# Patient Record
Sex: FEMALE | Race: BLACK OR AFRICAN AMERICAN | NOT HISPANIC OR LATINO | Employment: OTHER | URBAN - NONMETROPOLITAN AREA
[De-identification: names, ages, dates, MRNs, and addresses within clinical notes are randomized per-mention and may not be internally consistent; named-entity substitution may affect disease eponyms.]

---

## 2018-05-11 ENCOUNTER — HOSPITAL ENCOUNTER (EMERGENCY)
Facility: HOSPITAL | Age: 83
Discharge: HOME | End: 2018-05-11
Attending: EMERGENCY MEDICINE | Admitting: NURSE PRACTITIONER

## 2018-05-11 ENCOUNTER — APPOINTMENT (OUTPATIENT)
Dept: ULTRASOUND IMAGING | Facility: HOSPITAL | Age: 83
End: 2018-05-11

## 2018-05-11 VITALS
DIASTOLIC BLOOD PRESSURE: 66 MMHG | WEIGHT: 209 LBS | HEIGHT: 65 IN | SYSTOLIC BLOOD PRESSURE: 130 MMHG | BODY MASS INDEX: 34.82 KG/M2 | OXYGEN SATURATION: 96 % | RESPIRATION RATE: 18 BRPM | TEMPERATURE: 98.7 F | HEART RATE: 60 BPM

## 2018-05-11 DIAGNOSIS — N18.9 CHRONIC RENAL IMPAIRMENT, UNSPECIFIED CKD STAGE: ICD-10-CM

## 2018-05-11 DIAGNOSIS — L03.115 CELLULITIS OF RIGHT LEG: Primary | ICD-10-CM

## 2018-05-11 DIAGNOSIS — I73.9 PERIPHERAL VASCULAR DISEASE (HCC): ICD-10-CM

## 2018-05-11 LAB
ALBUMIN SERPL-MCNC: 3.5 G/DL (ref 3.5–5)
ALBUMIN/GLOB SERPL: 1.1 G/DL (ref 1–2)
ALP SERPL-CCNC: 55 U/L (ref 38–126)
ALT SERPL W P-5'-P-CCNC: 27 U/L (ref 13–69)
ANION GAP SERPL CALCULATED.3IONS-SCNC: 13 MMOL/L (ref 10–20)
AST SERPL-CCNC: 18 U/L (ref 15–46)
BASOPHILS # BLD AUTO: 0.03 10*3/MM3 (ref 0–0.2)
BASOPHILS NFR BLD AUTO: 0.5 % (ref 0–2.5)
BILIRUB SERPL-MCNC: 1 MG/DL (ref 0.2–1.3)
BUN BLD-MCNC: 51 MG/DL (ref 7–20)
BUN/CREAT SERPL: 26.8 (ref 7.1–23.5)
CALCIUM SPEC-SCNC: 9.1 MG/DL (ref 8.4–10.2)
CHLORIDE SERPL-SCNC: 101 MMOL/L (ref 98–107)
CO2 SERPL-SCNC: 33 MMOL/L (ref 26–30)
CREAT BLD-MCNC: 1.9 MG/DL (ref 0.6–1.3)
D-LACTATE SERPL-SCNC: 1.1 MMOL/L (ref 0.5–2)
DEPRECATED RDW RBC AUTO: 55 FL (ref 37–54)
EOSINOPHIL # BLD AUTO: 0.27 10*3/MM3 (ref 0–0.7)
EOSINOPHIL NFR BLD AUTO: 4.2 % (ref 0–7)
ERYTHROCYTE [DISTWIDTH] IN BLOOD BY AUTOMATED COUNT: 15.5 % (ref 11.5–14.5)
GFR SERPL CREATININE-BSD FRML MDRD: 30 ML/MIN/1.73
GLOBULIN UR ELPH-MCNC: 3.3 GM/DL
GLUCOSE BLD-MCNC: 102 MG/DL (ref 74–98)
HCT VFR BLD AUTO: 38.5 % (ref 37–47)
HGB BLD-MCNC: 13.1 G/DL (ref 12–16)
IMM GRANULOCYTES # BLD: 0.02 10*3/MM3 (ref 0–0.06)
IMM GRANULOCYTES NFR BLD: 0.3 % (ref 0–0.6)
LYMPHOCYTES # BLD AUTO: 1.9 10*3/MM3 (ref 0.6–3.4)
LYMPHOCYTES NFR BLD AUTO: 29.7 % (ref 10–50)
MCH RBC QN AUTO: 32.8 PG (ref 27–31)
MCHC RBC AUTO-ENTMCNC: 34 G/DL (ref 30–37)
MCV RBC AUTO: 96.3 FL (ref 81–99)
MONOCYTES # BLD AUTO: 0.68 10*3/MM3 (ref 0–0.9)
MONOCYTES NFR BLD AUTO: 10.6 % (ref 0–12)
NEUTROPHILS # BLD AUTO: 3.49 10*3/MM3 (ref 2–6.9)
NEUTROPHILS NFR BLD AUTO: 54.7 % (ref 37–80)
NRBC BLD MANUAL-RTO: 0 /100 WBC (ref 0–0)
PLATELET # BLD AUTO: 112 10*3/MM3 (ref 130–400)
PMV BLD AUTO: 10.9 FL (ref 6–12)
POTASSIUM BLD-SCNC: 3 MMOL/L (ref 3.5–5.1)
PROT SERPL-MCNC: 6.8 G/DL (ref 6.3–8.2)
RBC # BLD AUTO: 4 10*6/MM3 (ref 4.2–5.4)
SODIUM BLD-SCNC: 144 MMOL/L (ref 137–145)
WBC NRBC COR # BLD: 6.39 10*3/MM3 (ref 4.8–10.8)

## 2018-05-11 PROCEDURE — 80053 COMPREHEN METABOLIC PANEL: CPT | Performed by: NURSE PRACTITIONER

## 2018-05-11 PROCEDURE — 99284 EMERGENCY DEPT VISIT MOD MDM: CPT

## 2018-05-11 PROCEDURE — 83605 ASSAY OF LACTIC ACID: CPT | Performed by: NURSE PRACTITIONER

## 2018-05-11 PROCEDURE — 85025 COMPLETE CBC W/AUTO DIFF WBC: CPT | Performed by: NURSE PRACTITIONER

## 2018-05-11 PROCEDURE — 93971 EXTREMITY STUDY: CPT

## 2018-05-11 PROCEDURE — 87040 BLOOD CULTURE FOR BACTERIA: CPT | Performed by: NURSE PRACTITIONER

## 2018-05-11 RX ORDER — TRIAMTERENE AND HYDROCHLOROTHIAZIDE 75; 50 MG/1; MG/1
1 TABLET ORAL DAILY
COMMUNITY
End: 2018-09-29

## 2018-05-11 RX ORDER — FUROSEMIDE 20 MG/1
20 TABLET ORAL 2 TIMES DAILY
COMMUNITY
End: 2018-09-29

## 2018-05-11 RX ORDER — CARVEDILOL 12.5 MG/1
12.5 TABLET ORAL 2 TIMES DAILY WITH MEALS
COMMUNITY

## 2018-05-11 RX ORDER — ATORVASTATIN CALCIUM 40 MG/1
40 TABLET, FILM COATED ORAL DAILY
COMMUNITY

## 2018-05-11 RX ORDER — CEPHALEXIN 500 MG/1
500 CAPSULE ORAL 3 TIMES DAILY
Qty: 30 CAPSULE | Refills: 0 | Status: SHIPPED | OUTPATIENT
Start: 2018-05-11 | End: 2022-09-09 | Stop reason: HOSPADM

## 2018-05-11 RX ORDER — SODIUM CHLORIDE 0.9 % (FLUSH) 0.9 %
10 SYRINGE (ML) INJECTION AS NEEDED
Status: DISCONTINUED | OUTPATIENT
Start: 2018-05-11 | End: 2018-05-11 | Stop reason: HOSPADM

## 2018-05-11 RX ORDER — POTASSIUM CHLORIDE 750 MG/1
10 TABLET, FILM COATED, EXTENDED RELEASE ORAL 2 TIMES DAILY
COMMUNITY

## 2018-05-11 RX ORDER — VALSARTAN 320 MG/1
320 TABLET ORAL DAILY
COMMUNITY

## 2018-05-11 RX ADMIN — SODIUM CHLORIDE 500 ML: 9 INJECTION, SOLUTION INTRAVENOUS at 14:34

## 2018-05-11 NOTE — DISCHARGE INSTRUCTIONS
CONTINUE strict skin care to the patient's legs.  Keep skin moisturized to avoid breaking of the scaling lesions.  Apply the antibiotic ointment topically to any broken skin.  Antibiotics as directed.  You have an appointment at [facility] on Monday at 12:45 PM.    In care of her kidney function today.  Recommend decreasing the triamterene-hydrochlorothiazide medication by half each day.     Have a conversation with your primary care provider on Monday about home health resources that she may likely qualify for     Thank you and have a wonderful weekend.

## 2018-05-11 NOTE — ED PROVIDER NOTES
"Subjective   Patient presents to the emergency department in the care of her daughter who reports that the patient has had serial lesions to her lower legs which have healed in various times but most recently, she has a lesion on the right posterior lower leg that appears to be infected.  Patient has severe peripheral vascular disease to her lower limbs and clearly has a chronic problem with stable swelling and lichenification with ulceration to her legs.  She has had no fever.  She is eating, drinking, and voiding well.      Rash   Location:  Leg  Leg rash location:  R leg  Quality: dryness, peeling and redness    Severity:  Mild  Onset quality:  Gradual  Progression:  Worsening  Chronicity:  Recurrent  Relieved by:  Nothing  Worsened by:  Nothing  Ineffective treatments: daughter has an unknown prescription cream that they use on skin disruptions   Associated symptoms: myalgias (chronic)    Associated symptoms: no fever        Review of Systems   Constitutional: Negative for fever.   Eyes: Negative.    Respiratory: Negative.    Cardiovascular: Negative.    Gastrointestinal: Negative.    Endocrine: Negative.    Genitourinary: Negative.    Musculoskeletal: Positive for myalgias (chronic). Negative for back pain and neck pain.   Skin: Positive for rash. Negative for pallor.        Peripheral vascular disease and subsequent lichenification and scaling of her skin with various ulcerations   Allergic/Immunologic: Negative.    Neurological: Negative.    Hematological: Negative.    Psychiatric/Behavioral: Negative.    All other systems reviewed and are negative.      Past Medical History:   Diagnosis Date   • Hyperlipidemia    • Hypertension    • Myocardial infarction     family reports PCP stated pt had \"minor heart attack\"       No Known Allergies    Past Surgical History:   Procedure Laterality Date   • HYSTERECTOMY         History reviewed. No pertinent family history.    Social History     Social History   • Marital " status:      Social History Main Topics   • Smoking status: Never Smoker   • Alcohol use No   • Drug use: No   • Sexual activity: Defer     Other Topics Concern   • Not on file           Objective   Physical Exam   Constitutional: She is oriented to person, place, and time. She appears well-developed and well-nourished.   HENT:   Head: Normocephalic and atraumatic.   Eyes: EOM are normal. Pupils are equal, round, and reactive to light.   Neck: Normal range of motion. Neck supple.   Cardiovascular: Normal rate and regular rhythm.    Pulmonary/Chest: Effort normal and breath sounds normal. No respiratory distress. She has no wheezes. She has no rales.   Abdominal: Soft. Bowel sounds are normal. She exhibits no distension. There is no rebound and no guarding.   Musculoskeletal: Normal range of motion. She exhibits no edema.   Neurological: She is alert and oriented to person, place, and time.   Skin: Skin is warm and dry. Capillary refill takes less than 2 seconds. She is not diaphoretic.   On the patient's right lower extremity limb patient has chronic lymphedema and pedal edema bilaterally with referral vascular disease and subsequent lichenification and scaling of her skin.  There are several ulcerations which are in good repair and an healing condition.  Patient has onychomycotic nails and normal sensation.  There is a 1 cm x 1 cm scale that is disrupted on the posterior lower leg on the right that has some local redness and induration without circumferential swelling or wheezing.  There is no abscess or fluctuance.   Psychiatric: She has a normal mood and affect. Her behavior is normal.   Nursing note and vitals reviewed.      Procedures           ED Course  ED Course   Value Comment By Time   BUN: (!) 51 (Reviewed) Adwoa Guzman, APRN 05/11 1353   Creatinine: (!) 1.90 (Reviewed) Adwoa Guzman, APRN 05/11 1353   BUN: (!) 51 (Reviewed) Adwoa Guzman, APRN 05/11 1358   Creatinine: (!) 1.90  (Reviewed) Adwoamagdy Guzman, APRN 05/11 1358   Potassium: (!) 3.0 (Reviewed) Adwoa Guzman, APRN 05/11 1356    I have conferred with our ER physician, Dr. Chavez.   Adwoa Guzman, APRN 05/11 6994    I have spoken to the patient's provider, Omar Crowley at [facility] here in [place].  Patient's renal insufficiency has been noted just recently in mid April where she had a creatinine of 1.6.  Her provider makes note that her creatinine has been rising slowly but steadily in the last year, confirming a chronic problem.  In the meantime, her potassium is not elevated but rather it is slightly low at 3.0.    I have discussed this with the many family members who care for her.  Understand after some discussion that the patient does not meet admission criteria.  They understand that outpatient follow-up is recommended together with good hydration.  I have reviewed her medications and she is indeed taking her potassium daily.  She is on Lasix as well as hydrochlorothiazide.  I am going to decrease her hydrochlorothiazide by half each day in an effort to preserve her renal function a little bit.  I have made an appointment for her at [facility] with her doctor on Monday at 1245.      I have admonished the family for the good care of her skin on her legs and recommended continued diligence.  Continue discussion leads me to think that the family may have unrealistic expectations about what may be done for the patient's chronic leg lymphedema and peripheral vascular disease of her legs.  I have recommended elevation, continued good skin care with close follow-up.  This superficial infection on her leg will be managed outpatient and they understand the rationale.    I have also recommended that they confer with primary care regarding home health options to give them some assistance. Adwoa Guzman, APRN 05/11 1500        Recent Results (from the past 24 hour(s))   Comprehensive Metabolic  Panel    Collection Time: 05/11/18  1:06 PM   Result Value Ref Range    Glucose 102 (H) 74 - 98 mg/dL    BUN 51 (H) 7 - 20 mg/dL    Creatinine 1.90 (H) 0.60 - 1.30 mg/dL    Sodium 144 137 - 145 mmol/L    Potassium 3.0 (L) 3.5 - 5.1 mmol/L    Chloride 101 98 - 107 mmol/L    CO2 33.0 (H) 26.0 - 30.0 mmol/L    Calcium 9.1 8.4 - 10.2 mg/dL    Total Protein 6.8 6.3 - 8.2 g/dL    Albumin 3.50 3.50 - 5.00 g/dL    ALT (SGPT) 27 13 - 69 U/L    AST (SGOT) 18 15 - 46 U/L    Alkaline Phosphatase 55 38 - 126 U/L    Total Bilirubin 1.0 0.2 - 1.3 mg/dL    eGFR  African Amer 30 (L) >60 mL/min/1.73    Globulin 3.3 gm/dL    A/G Ratio 1.1 1.0 - 2.0 g/dL    BUN/Creatinine Ratio 26.8 (H) 7.1 - 23.5    Anion Gap 13.0 10.0 - 20.0 mmol/L   Lactic Acid, Plasma    Collection Time: 05/11/18  1:06 PM   Result Value Ref Range    Lactate 1.1 0.5 - 2.0 mmol/L   CBC Auto Differential    Collection Time: 05/11/18  1:06 PM   Result Value Ref Range    WBC 6.39 4.80 - 10.80 10*3/mm3    RBC 4.00 (L) 4.20 - 5.40 10*6/mm3    Hemoglobin 13.1 12.0 - 16.0 g/dL    Hematocrit 38.5 37.0 - 47.0 %    MCV 96.3 81.0 - 99.0 fL    MCH 32.8 (H) 27.0 - 31.0 pg    MCHC 34.0 30.0 - 37.0 g/dL    RDW 15.5 (H) 11.5 - 14.5 %    RDW-SD 55.0 (H) 37.0 - 54.0 fl    MPV 10.9 6.0 - 12.0 fL    Platelets 112 (L) 130 - 400 10*3/mm3    Neutrophil % 54.7 37.0 - 80.0 %    Lymphocyte % 29.7 10.0 - 50.0 %    Monocyte % 10.6 0.0 - 12.0 %    Eosinophil % 4.2 0.0 - 7.0 %    Basophil % 0.5 0.0 - 2.5 %    Immature Grans % 0.3 0.0 - 0.6 %    Neutrophils, Absolute 3.49 2.00 - 6.90 10*3/mm3    Lymphocytes, Absolute 1.90 0.60 - 3.40 10*3/mm3    Monocytes, Absolute 0.68 0.00 - 0.90 10*3/mm3    Eosinophils, Absolute 0.27 0.00 - 0.70 10*3/mm3    Basophils, Absolute 0.03 0.00 - 0.20 10*3/mm3    Immature Grans, Absolute 0.02 0.00 - 0.06 10*3/mm3    nRBC 0.0 0.0 - 0.0 /100 WBC     Note: In addition to lab results from this visit, the labs listed above may include labs taken at another facility or  "during a different encounter within the last 24 hours. Please correlate lab times with ED admission and discharge times for further clarification of the services performed during this visit.    US Venous Doppler Lower Extremity Right (duplex)   Final Result   No evidence of deep venous thrombosis.       This report was finalized on 5/11/2018 1:48 PM by Bryn Harper M.D..        Vitals:    05/11/18 1046 05/11/18 1437 05/11/18 1438   BP: 94/58 125/67 125/67   BP Location:  Left arm    Patient Position:  Lying    Pulse: 67 58    Resp: 18 17    Temp: 98.8 °F (37.1 °C) 98.7 °F (37.1 °C)    TempSrc:  Oral    SpO2: 98% 97% 96%   Weight: 94.8 kg (209 lb)     Height: 165.1 cm (65\")       Medications   sodium chloride 0.9 % flush 10 mL (not administered)   sodium chloride 0.9 % bolus 500 mL (0 mL Intravenous Stopped 5/11/18 1506)     ECG/EMG Results (last 24 hours)     ** No results found for the last 24 hours. **                  [facility]      Final diagnoses:   Cellulitis of right leg   Peripheral vascular disease   Chronic renal impairment, unspecified CKD stage            Adwoa Guzman, APRN  05/11/18 1604    "

## 2018-05-16 LAB
BACTERIA SPEC AEROBE CULT: NORMAL
BACTERIA SPEC AEROBE CULT: NORMAL

## 2018-09-28 ENCOUNTER — APPOINTMENT (OUTPATIENT)
Dept: GENERAL RADIOLOGY | Facility: HOSPITAL | Age: 83
End: 2018-09-28

## 2018-09-28 ENCOUNTER — HOSPITAL ENCOUNTER (EMERGENCY)
Facility: HOSPITAL | Age: 83
Discharge: HOME | End: 2018-09-29
Attending: EMERGENCY MEDICINE | Admitting: EMERGENCY MEDICINE
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
## 2018-09-28 DIAGNOSIS — E86.0 DEHYDRATION: ICD-10-CM

## 2018-09-28 DIAGNOSIS — S80.02XA CONTUSION OF LEFT KNEE, INITIAL ENCOUNTER: ICD-10-CM

## 2018-09-28 DIAGNOSIS — N17.9 AKI (ACUTE KIDNEY INJURY) (HCC): Primary | ICD-10-CM

## 2018-09-28 LAB
ALBUMIN SERPL-MCNC: 3.6 G/DL (ref 3.5–5)
ALBUMIN/GLOB SERPL: 1.1 G/DL (ref 1–2)
ALP SERPL-CCNC: 41 U/L (ref 38–126)
ALT SERPL W P-5'-P-CCNC: 31 U/L (ref 13–69)
ANION GAP SERPL CALCULATED.3IONS-SCNC: 13.1 MMOL/L (ref 10–20)
AST SERPL-CCNC: 20 U/L (ref 15–46)
BACTERIA UR QL AUTO: ABNORMAL /HPF
BASOPHILS # BLD AUTO: 0.03 10*3/MM3 (ref 0–0.2)
BASOPHILS NFR BLD AUTO: 0.5 % (ref 0–2.5)
BILIRUB SERPL-MCNC: 0.7 MG/DL (ref 0.2–1.3)
BILIRUB UR QL STRIP: NEGATIVE
BUN BLD-MCNC: 85 MG/DL (ref 7–20)
BUN/CREAT SERPL: 21.8 (ref 7.1–23.5)
CALCIUM SPEC-SCNC: 9.3 MG/DL (ref 8.4–10.2)
CHLORIDE SERPL-SCNC: 101 MMOL/L (ref 98–107)
CLARITY UR: CLEAR
CO2 SERPL-SCNC: 30 MMOL/L (ref 26–30)
COLOR UR: YELLOW
CREAT BLD-MCNC: 3.9 MG/DL (ref 0.6–1.3)
DEPRECATED RDW RBC AUTO: 50.8 FL (ref 37–54)
EOSINOPHIL # BLD AUTO: 0.2 10*3/MM3 (ref 0–0.7)
EOSINOPHIL NFR BLD AUTO: 3 % (ref 0–7)
ERYTHROCYTE [DISTWIDTH] IN BLOOD BY AUTOMATED COUNT: 14.2 % (ref 11.5–14.5)
GFR SERPL CREATININE-BSD FRML MDRD: 13 ML/MIN/1.73
GFR SERPL CREATININE-BSD FRML MDRD: ABNORMAL ML/MIN/1.73
GLOBULIN UR ELPH-MCNC: 3.2 GM/DL
GLUCOSE BLD-MCNC: 111 MG/DL (ref 74–98)
GLUCOSE UR STRIP-MCNC: NEGATIVE MG/DL
HCT VFR BLD AUTO: 35.4 % (ref 37–47)
HGB BLD-MCNC: 11.7 G/DL (ref 12–16)
HGB UR QL STRIP.AUTO: NEGATIVE
HYALINE CASTS UR QL AUTO: ABNORMAL /LPF
IMM GRANULOCYTES # BLD: 0.02 10*3/MM3 (ref 0–0.06)
IMM GRANULOCYTES NFR BLD: 0.3 % (ref 0–0.6)
KETONES UR QL STRIP: NEGATIVE
LEUKOCYTE ESTERASE UR QL STRIP.AUTO: ABNORMAL
LYMPHOCYTES # BLD AUTO: 1.46 10*3/MM3 (ref 0.6–3.4)
LYMPHOCYTES NFR BLD AUTO: 22.2 % (ref 10–50)
MCH RBC QN AUTO: 32.1 PG (ref 27–31)
MCHC RBC AUTO-ENTMCNC: 33.1 G/DL (ref 30–37)
MCV RBC AUTO: 97.3 FL (ref 81–99)
MONOCYTES # BLD AUTO: 0.77 10*3/MM3 (ref 0–0.9)
MONOCYTES NFR BLD AUTO: 11.7 % (ref 0–12)
NEUTROPHILS # BLD AUTO: 4.09 10*3/MM3 (ref 2–6.9)
NEUTROPHILS NFR BLD AUTO: 62.3 % (ref 37–80)
NITRITE UR QL STRIP: NEGATIVE
NRBC BLD MANUAL-RTO: 0 /100 WBC (ref 0–0)
NT-PROBNP SERPL-MCNC: 3780 PG/ML (ref 0–450)
PH UR STRIP.AUTO: 6 [PH] (ref 5–8)
PLATELET # BLD AUTO: 118 10*3/MM3 (ref 130–400)
PMV BLD AUTO: 10.9 FL (ref 6–12)
POTASSIUM BLD-SCNC: 3.1 MMOL/L (ref 3.5–5.1)
PROT SERPL-MCNC: 6.8 G/DL (ref 6.3–8.2)
PROT UR QL STRIP: NEGATIVE
RBC # BLD AUTO: 3.64 10*6/MM3 (ref 4.2–5.4)
RBC # UR: ABNORMAL /HPF
REF LAB TEST METHOD: ABNORMAL
SODIUM BLD-SCNC: 141 MMOL/L (ref 137–145)
SP GR UR STRIP: 1.01 (ref 1–1.03)
SQUAMOUS #/AREA URNS HPF: ABNORMAL /HPF
UROBILINOGEN UR QL STRIP: ABNORMAL
WBC NRBC COR # BLD: 6.57 10*3/MM3 (ref 4.8–10.8)
WBC UR QL AUTO: ABNORMAL /HPF

## 2018-09-28 PROCEDURE — 80053 COMPREHEN METABOLIC PANEL: CPT | Performed by: EMERGENCY MEDICINE

## 2018-09-28 PROCEDURE — 72170 X-RAY EXAM OF PELVIS: CPT

## 2018-09-28 PROCEDURE — 85025 COMPLETE CBC W/AUTO DIFF WBC: CPT | Performed by: EMERGENCY MEDICINE

## 2018-09-28 PROCEDURE — 99283 EMERGENCY DEPT VISIT LOW MDM: CPT

## 2018-09-28 PROCEDURE — 83880 ASSAY OF NATRIURETIC PEPTIDE: CPT | Performed by: EMERGENCY MEDICINE

## 2018-09-28 PROCEDURE — 81001 URINALYSIS AUTO W/SCOPE: CPT | Performed by: EMERGENCY MEDICINE

## 2018-09-28 PROCEDURE — 73562 X-RAY EXAM OF KNEE 3: CPT

## 2018-09-28 PROCEDURE — 71045 X-RAY EXAM CHEST 1 VIEW: CPT

## 2018-09-29 VITALS
HEART RATE: 59 BPM | SYSTOLIC BLOOD PRESSURE: 123 MMHG | OXYGEN SATURATION: 96 % | RESPIRATION RATE: 16 BRPM | DIASTOLIC BLOOD PRESSURE: 66 MMHG | TEMPERATURE: 98.8 F

## 2018-09-29 PROBLEM — E86.0 DEHYDRATION: Status: ACTIVE | Noted: 2018-09-29

## 2018-09-29 PROBLEM — N17.9 AKI (ACUTE KIDNEY INJURY): Status: ACTIVE | Noted: 2018-09-29
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
## 2018-09-29 PROBLEM — S80.02XA CONTUSION OF LEFT KNEE: Status: ACTIVE | Noted: 2018-09-29

## 2018-09-29 PROCEDURE — 96360 HYDRATION IV INFUSION INIT: CPT

## 2018-09-29 RX ADMIN — SODIUM CHLORIDE 500 ML: 9 INJECTION, SOLUTION INTRAVENOUS at 00:45

## 2020-10-08 ENCOUNTER — HOSPITAL ENCOUNTER (EMERGENCY)
Facility: HOSPITAL | Age: 85
Discharge: HOME | End: 2020-10-08
Attending: EMERGENCY MEDICINE | Admitting: EMERGENCY MEDICINE

## 2020-10-08 ENCOUNTER — APPOINTMENT (OUTPATIENT)
Dept: GENERAL RADIOLOGY | Facility: HOSPITAL | Age: 85
End: 2020-10-08

## 2020-10-08 VITALS
TEMPERATURE: 98 F | DIASTOLIC BLOOD PRESSURE: 58 MMHG | OXYGEN SATURATION: 95 % | WEIGHT: 200 LBS | HEIGHT: 65 IN | BODY MASS INDEX: 33.32 KG/M2 | HEART RATE: 52 BPM | RESPIRATION RATE: 14 BRPM | SYSTOLIC BLOOD PRESSURE: 113 MMHG

## 2020-10-08 DIAGNOSIS — R07.9 CHEST PAIN, UNSPECIFIED TYPE: Primary | ICD-10-CM

## 2020-10-08 DIAGNOSIS — E87.6 HYPOKALEMIA: ICD-10-CM

## 2020-10-08 LAB
ALBUMIN SERPL-MCNC: 3.2 G/DL (ref 3.5–5.2)
ALBUMIN/GLOB SERPL: 1 G/DL
ALP SERPL-CCNC: 52 U/L (ref 39–117)
ALT SERPL W P-5'-P-CCNC: 8 U/L (ref 1–33)
ANION GAP SERPL CALCULATED.3IONS-SCNC: 12.8 MMOL/L (ref 5–15)
AST SERPL-CCNC: 14 U/L (ref 1–32)
BASOPHILS # BLD AUTO: 0.04 10*3/MM3 (ref 0–0.2)
BASOPHILS NFR BLD AUTO: 0.5 % (ref 0–1.5)
BILIRUB SERPL-MCNC: 1.3 MG/DL (ref 0–1.2)
BUN SERPL-MCNC: 32 MG/DL (ref 8–23)
BUN/CREAT SERPL: 20 (ref 7–25)
CALCIUM SPEC-SCNC: 9.2 MG/DL (ref 8.6–10.5)
CHLORIDE SERPL-SCNC: 97 MMOL/L (ref 98–107)
CO2 SERPL-SCNC: 30.2 MMOL/L (ref 22–29)
CREAT SERPL-MCNC: 1.6 MG/DL (ref 0.57–1)
DEPRECATED RDW RBC AUTO: 50.2 FL (ref 37–54)
EOSINOPHIL # BLD AUTO: 0.29 10*3/MM3 (ref 0–0.4)
EOSINOPHIL NFR BLD AUTO: 3.7 % (ref 0.3–6.2)
ERYTHROCYTE [DISTWIDTH] IN BLOOD BY AUTOMATED COUNT: 14.5 % (ref 12.3–15.4)
GFR SERPL CREATININE-BSD FRML MDRD: 37 ML/MIN/1.73
GLOBULIN UR ELPH-MCNC: 3.1 GM/DL
GLUCOSE SERPL-MCNC: 107 MG/DL (ref 65–99)
HCT VFR BLD AUTO: 37.7 % (ref 34–46.6)
HGB BLD-MCNC: 12.8 G/DL (ref 12–15.9)
HOLD SPECIMEN: NORMAL
IMM GRANULOCYTES # BLD AUTO: 0.04 10*3/MM3 (ref 0–0.05)
IMM GRANULOCYTES NFR BLD AUTO: 0.5 % (ref 0–0.5)
LYMPHOCYTES # BLD AUTO: 2.09 10*3/MM3 (ref 0.7–3.1)
LYMPHOCYTES NFR BLD AUTO: 26.4 % (ref 19.6–45.3)
MCH RBC QN AUTO: 31.9 PG (ref 26.6–33)
MCHC RBC AUTO-ENTMCNC: 34 G/DL (ref 31.5–35.7)
MCV RBC AUTO: 94 FL (ref 79–97)
MONOCYTES # BLD AUTO: 1.22 10*3/MM3 (ref 0.1–0.9)
MONOCYTES NFR BLD AUTO: 15.4 % (ref 5–12)
NEUTROPHILS NFR BLD AUTO: 4.25 10*3/MM3 (ref 1.7–7)
NEUTROPHILS NFR BLD AUTO: 53.5 % (ref 42.7–76)
NRBC BLD AUTO-RTO: 0 /100 WBC (ref 0–0.2)
PLATELET # BLD AUTO: 126 10*3/MM3 (ref 140–450)
PMV BLD AUTO: 11 FL (ref 6–12)
POTASSIUM SERPL-SCNC: 2.8 MMOL/L (ref 3.5–5.2)
PROT SERPL-MCNC: 6.3 G/DL (ref 6–8.5)
RBC # BLD AUTO: 4.01 10*6/MM3 (ref 3.77–5.28)
SODIUM SERPL-SCNC: 140 MMOL/L (ref 136–145)
TROPONIN T SERPL-MCNC: <0.01 NG/ML (ref 0–0.03)
WBC # BLD AUTO: 7.93 10*3/MM3 (ref 3.4–10.8)
WHOLE BLOOD HOLD SPECIMEN: NORMAL

## 2020-10-08 PROCEDURE — 71045 X-RAY EXAM CHEST 1 VIEW: CPT

## 2020-10-08 PROCEDURE — 80053 COMPREHEN METABOLIC PANEL: CPT

## 2020-10-08 PROCEDURE — 85025 COMPLETE CBC W/AUTO DIFF WBC: CPT

## 2020-10-08 PROCEDURE — 99284 EMERGENCY DEPT VISIT MOD MDM: CPT

## 2020-10-08 PROCEDURE — 93005 ELECTROCARDIOGRAM TRACING: CPT

## 2020-10-08 PROCEDURE — 84484 ASSAY OF TROPONIN QUANT: CPT

## 2020-10-08 RX ORDER — POTASSIUM CHLORIDE 750 MG/1
40 CAPSULE, EXTENDED RELEASE ORAL ONCE
Status: DISCONTINUED | OUTPATIENT
Start: 2020-10-08 | End: 2020-10-08

## 2020-10-08 RX ORDER — POTASSIUM CHLORIDE 750 MG/1
20 CAPSULE, EXTENDED RELEASE ORAL ONCE
Status: COMPLETED | OUTPATIENT
Start: 2020-10-08 | End: 2020-10-08

## 2020-10-08 RX ORDER — ASPIRIN 325 MG
325 TABLET ORAL ONCE
Status: DISCONTINUED | OUTPATIENT
Start: 2020-10-08 | End: 2020-10-08

## 2020-10-08 RX ORDER — ERGOCALCIFEROL 1.25 MG/1
50000 CAPSULE ORAL WEEKLY
COMMUNITY

## 2020-10-08 RX ORDER — TRIAMTERENE AND HYDROCHLOROTHIAZIDE 75; 50 MG/1; MG/1
1 TABLET ORAL DAILY
COMMUNITY

## 2020-10-08 RX ORDER — SODIUM CHLORIDE 0.9 % (FLUSH) 0.9 %
10 SYRINGE (ML) INJECTION AS NEEDED
Status: DISCONTINUED | OUTPATIENT
Start: 2020-10-08 | End: 2020-10-08 | Stop reason: HOSPADM

## 2020-10-08 RX ORDER — CLINDAMYCIN HYDROCHLORIDE 300 MG/1
300 CAPSULE ORAL 3 TIMES DAILY
COMMUNITY
End: 2022-09-09 | Stop reason: HOSPADM

## 2020-10-08 RX ORDER — FUROSEMIDE 20 MG/1
20 TABLET ORAL 2 TIMES DAILY
COMMUNITY

## 2020-10-08 RX ORDER — LORATADINE 10 MG/1
10 TABLET ORAL DAILY
COMMUNITY

## 2020-10-08 RX ADMIN — POTASSIUM CHLORIDE 20 MEQ: 10 CAPSULE, COATED, EXTENDED RELEASE ORAL at 17:06

## 2020-10-08 NOTE — ED PROVIDER NOTES
"Subjective   History of Present Illness  Is a 88-year-old female who comes in today complaining of intermittent chest pain x1 week.  Her daughter reports that she started having chest pain again while sitting in the chair today about 1 to 2 hours prior to arrival.  She denies any chest pain at this time.  She denies any fever chills or shortness of breath.  Review of Systems   Constitutional: Negative.    HENT: Negative.    Eyes: Negative.    Respiratory: Negative.    Cardiovascular: Positive for chest pain.   Gastrointestinal: Negative.    Endocrine: Negative.    Genitourinary: Negative.    Musculoskeletal: Negative.    Skin: Negative.    Allergic/Immunologic: Negative.    Neurological: Negative.    Hematological: Negative.    Psychiatric/Behavioral: Negative.        Past Medical History:   Diagnosis Date   • Hyperlipidemia    • Hypertension    • Myocardial infarction (CMS/HCC)     family reports PCP stated pt had \"minor heart attack\"       No Known Allergies    Past Surgical History:   Procedure Laterality Date   • HYSTERECTOMY         History reviewed. No pertinent family history.    Social History     Socioeconomic History   • Marital status:      Spouse name: Not on file   • Number of children: Not on file   • Years of education: Not on file   • Highest education level: Not on file   Tobacco Use   • Smoking status: Never Smoker   Substance and Sexual Activity   • Alcohol use: No   • Drug use: No   • Sexual activity: Defer           Objective   Physical Exam  Vitals signs and nursing note reviewed.   Constitutional:       Appearance: She is well-developed and normal weight.   Neurological:      Mental Status: She is alert.     GEN: No acute distress  Head: Normocephalic, atraumatic  Eyes: Pupils equal round reactive to light  ENT: Posterior pharynx normal in appearance, oral mucosa is moist  Chest: Nontender to palpation  Cardiovascular: Regular rate  Lungs: Clear to auscultation bilaterally  Abdomen: " "Soft, nontender, nondistended, no peritoneal signs  Extremities: Edema noted with lower extremities wrapped for venous insufficiency  Neuro: GCS 15  Psych: Mood and affect are appropriate      Procedures           ED Course  ED Course as of Oct 08 1710   Thu Oct 08, 2020   1559 EKG interpreted by me reveals atrial fibrillation with a rate of 61 bpm.  There are diffuse T wave inversions and nonspecific ST segment abnormalities.  This is an abnormal appearing EKG.  Most findings are similar to previous EKG.    [TB]   1629 Patient was difficulty obtaining labs.  The lab person was in drawing her blood.  The patient responded that the lab person did not want to draw her blood because she did not want to draw \"black persons blood\".  I reassured her that that was not the case that she was difficult obtaining labs due to her veins.  She had been stuck multiple times prior.    [TW]   1706 Patient was just prescribed potassium from her pcp and she took one just prior to arrival 20meq.we will give her 20meq more now and have her also take another 40meq in four hours. I have advised her to follow up with her pcp and we will give her another cardiologist to follow up with. They are agreeable to this plan of care.     [TW]      ED Course User Index  [TB] Meli Thorpe MD  [TW] Gilma Crowley, OLLIE                                           [facility]  Number of Diagnoses or Management Options     Amount and/or Complexity of Data Reviewed  Clinical lab tests: ordered and reviewed  Tests in the radiology section of CPT®: reviewed and ordered    Risk of Complications, Morbidity, and/or Mortality  Presenting problems: moderate  Diagnostic procedures: moderate  Management options: moderate        Final diagnoses:   Chest pain, unspecified type   Hypokalemia            Gilma Crowley APRN  10/08/20 1710       Gilma Crowley APRN  10/08/20 1710    "

## 2021-08-10 ENCOUNTER — LAB REQUISITION (OUTPATIENT)
Dept: LAB | Facility: HOSPITAL | Age: 86
End: 2021-08-10

## 2021-08-10 DIAGNOSIS — E11.51 TYPE 2 DIABETES MELLITUS WITH DIABETIC PERIPHERAL ANGIOPATHY WITHOUT GANGRENE (HCC): ICD-10-CM

## 2021-08-10 PROCEDURE — 87205 SMEAR GRAM STAIN: CPT | Performed by: FAMILY MEDICINE

## 2021-08-10 PROCEDURE — 87070 CULTURE OTHR SPECIMN AEROBIC: CPT | Performed by: FAMILY MEDICINE

## 2021-08-13 LAB
BACTERIA SPEC AEROBE CULT: ABNORMAL
GRAM STN SPEC: ABNORMAL

## 2021-09-07 ENCOUNTER — OFFICE VISIT (OUTPATIENT)
Dept: CARDIOLOGY | Facility: CLINIC | Age: 86
End: 2021-09-07

## 2021-09-07 VITALS
SYSTOLIC BLOOD PRESSURE: 126 MMHG | HEART RATE: 70 BPM | BODY MASS INDEX: 33.28 KG/M2 | HEIGHT: 65 IN | DIASTOLIC BLOOD PRESSURE: 60 MMHG | OXYGEN SATURATION: 98 %

## 2021-09-07 DIAGNOSIS — R01.1 HEART MURMUR: ICD-10-CM

## 2021-09-07 DIAGNOSIS — I48.20 CHRONIC ATRIAL FIBRILLATION (HCC): Primary | ICD-10-CM

## 2021-09-07 DIAGNOSIS — R07.9 CHEST PAIN, UNSPECIFIED TYPE: ICD-10-CM

## 2021-09-07 PROCEDURE — 99204 OFFICE O/P NEW MOD 45 MIN: CPT | Performed by: INTERNAL MEDICINE

## 2021-09-07 PROCEDURE — 93000 ELECTROCARDIOGRAM COMPLETE: CPT | Performed by: INTERNAL MEDICINE

## 2021-09-07 NOTE — PROGRESS NOTES
[facility] OP Consult Note    Yovana Mcneal  [id number]  09/07/2021    Referred By: Domenica Fernandez DO    Chief Complaint: Atrial fibrillation and chest pain    History of Present Illness:   Mrs. Yovana Mcneal is a 89 y.o. female who presents to the Cardiology Clinic for evaluation of atrial fibrillation.  The patient has a past medical history significant for hypertension hyperlipidemia, and dementia.  She has a past cardiac history significant for atrial fibrillation, documented on ECG as early as 2015.  She presents the cardiology clinic today reportedly for evaluation of chest pain.  Per family report, the patient has occasionally complained of chest pain.  Currently, the patient denies chest pain, however she appears to have significant difficulty with memory in the setting of dementia.  The patient's family is unable to further define her chest pain, and her frequency of chest pain is currently unknown.  She currently denies palpitations.  The patient has no specific complaints.    Review of Systems:   Review of Systems   Constitutional: Negative for activity change, appetite change, chills, diaphoresis, fatigue, fever, unexpected weight gain and unexpected weight loss.   Eyes: Negative for blurred vision and double vision.   Respiratory: Negative for cough, chest tightness, shortness of breath and wheezing.    Cardiovascular: Positive for chest pain. Negative for palpitations and leg swelling.   Gastrointestinal: Negative for abdominal pain, anal bleeding, blood in stool and GERD.   Endocrine: Negative for cold intolerance and heat intolerance.   Genitourinary: Negative for hematuria.   Neurological: Negative for dizziness, syncope, weakness and light-headedness.   Hematological: Does not bruise/bleed easily.   Psychiatric/Behavioral: Negative for depressed mood and stress. The patient is not nervous/anxious.        Past Medical History:   Past Medical History:   Diagnosis Date  "  • Hyperlipidemia    • Hypertension    • Myocardial infarction (CMS/HCC)     family reports PCP stated pt had \"minor heart attack\"       Past Surgical History:   Past Surgical History:   Procedure Laterality Date   • HYSTERECTOMY         Family History: No family history on file.    Social History:   Social History     Socioeconomic History   • Marital status:      Spouse name: Not on file   • Number of children: Not on file   • Years of education: Not on file   • Highest education level: Not on file   Tobacco Use   • Smoking status: Never Smoker   Vaping Use   • Vaping Use: Never used   Substance and Sexual Activity   • Alcohol use: No   • Drug use: No   • Sexual activity: Defer       Medications:     Current Outpatient Medications:   •  apixaban (ELIQUIS) 5 MG tablet tablet, Take 5 mg by mouth 2 (Two) Times a Day., Disp: , Rfl:   •  atorvastatin (LIPITOR) 40 MG tablet, Take 40 mg by mouth Daily., Disp: , Rfl:   •  furosemide (LASIX) 20 MG tablet, Take 20 mg by mouth 2 (Two) Times a Day., Disp: , Rfl:   •  loratadine (CLARITIN) 10 MG tablet, Take 10 mg by mouth Daily., Disp: , Rfl:   •  mupirocin (BACTROBAN) 2 % ointment, Apply  topically 3 (Three) Times a Day., Disp: 22 g, Rfl: 0  •  potassium chloride (K-DUR) 10 MEQ CR tablet, Take 10 mEq by mouth 2 (Two) Times a Day., Disp: , Rfl:   •  triamterene-hydrochlorothiazide (MAXZIDE) 75-50 MG per tablet, Take 1 tablet by mouth Daily., Disp: , Rfl:   •  vitamin D (ERGOCALCIFEROL) 1.25 MG (06425 UT) capsule capsule, Take 50,000 Units by mouth 1 (One) Time Per Week., Disp: , Rfl:   •  carvedilol (COREG) 12.5 MG tablet, Take 12.5 mg by mouth 2 (Two) Times a Day With Meals., Disp: , Rfl:   •  cephalexin (KEFLEX) 500 MG capsule, Take 1 capsule by mouth 3 (Three) Times a Day., Disp: 30 capsule, Rfl: 0  •  Cholecalciferol (VITAMIN D3) 5000 units capsule capsule, Take 5,000 Units by mouth 1 (One) Time Per Week., Disp: , Rfl:   •  clindamycin (CLEOCIN) 300 MG capsule, " "Take 300 mg by mouth 3 (Three) Times a Day., Disp: , Rfl:   •  valsartan (DIOVAN) 320 MG tablet, Take 320 mg by mouth Daily., Disp: , Rfl:     Allergies:   No Known Allergies    Physical Exam:  Vital Signs:   Vitals:    09/07/21 1506 09/07/21 1511   BP: 126/62 126/60   BP Location: Right arm Left arm   Patient Position: Sitting Sitting   Pulse: 70    SpO2: 98%    Weight: Comment: UNABLE TO WEIGH PT IN WHEEL CHAIR    Height: 165.1 cm (65\")        Physical Exam  Constitutional:       Appearance: She is well-developed. She is not diaphoretic.      Comments: Elderly female in no acute distress   HENT:      Head: Normocephalic and atraumatic.   Eyes:      General: No scleral icterus.     Pupils: Pupils are equal, round, and reactive to light.   Neck:      Trachea: No tracheal deviation.   Cardiovascular:      Rate and Rhythm: Normal rate and regular rhythm.      Heart sounds: Normal heart sounds. No friction rub. No gallop.       Comments: Normal JVD. Soft systolic murmur over the left sternal border  Pulmonary:      Effort: Pulmonary effort is normal. No respiratory distress.      Breath sounds: Normal breath sounds. No stridor. No wheezing or rales.   Chest:      Chest wall: No tenderness.   Abdominal:      General: Bowel sounds are normal. There is no distension.      Palpations: Abdomen is soft.      Tenderness: There is no abdominal tenderness. There is no guarding or rebound.   Musculoskeletal:         General: Swelling present. Normal range of motion.      Cervical back: Neck supple. No tenderness.   Lymphadenopathy:      Cervical: No cervical adenopathy.   Skin:     General: Skin is warm and dry.      Findings: No erythema.   Neurological:      General: No focal deficit present.      Mental Status: She is alert and oriented to person, place, and time.   Psychiatric:      Comments: Dementia         Results Review:   I reviewed the patient's new clinical results.  I personally viewed and interpreted the patient's " EKG/Telemetry data      ECG 12 Lead    Date/Time: 9/7/2021 4:40 PM  Performed by: Sorin Rothman MD  Authorized by: Sorin Rothman MD   Comparison: not compared with previous ECG   Rhythm: atrial fibrillation  Rate: normal  Conduction: right bundle branch block  QRS axis: normal  Other findings comments: Cannot rule out prior septal MI.  Nonspecific ST changes.    Clinical impression: abnormal EKG            Assessment / Plan:     1.  Permanent atrial fibrillation   --On review of prior ECGs, long history of permanent atrial fibrillation  --Currently rate controlled  --Asymptomatic  --Continue Eliquis for CVA prophylaxis  --Continue carvedilol for rate control    2. Chest pain  --Reported history of chest pain per family report, however unable to further clarify due to patient's underlying dementia  --ECG today without evidence of acute ischemia  --The patient and her family declined further ischemic evaluation at this time  --Continue statin  --Follow-up in 3 months to reevaluate symptoms    3.  Cardiac murmur  --Soft systolic murmur on exam today, possible aortic stenosis  --Appears to be asymptomatic  --The patient's family has deferred further evaluation at this time      Follow Up:   Return in about 3 months (around 12/7/2021).      Thank you for allowing me to participate in the care of your patient. Please to not hesitate to contact me with additional questions or concerns.     MELQUIADES Rothman MD  Interventional Cardiology   09/07/2021  14:55 EDT

## 2021-11-08 ENCOUNTER — LAB REQUISITION (OUTPATIENT)
Dept: LAB | Facility: HOSPITAL | Age: 86
End: 2021-11-08

## 2021-11-08 DIAGNOSIS — E11.51 TYPE 2 DIABETES MELLITUS WITH DIABETIC PERIPHERAL ANGIOPATHY WITHOUT GANGRENE (HCC): ICD-10-CM

## 2021-11-08 DIAGNOSIS — L97.911 NON-PRESSURE CHRONIC ULCER OF UNSPECIFIED PART OF RIGHT LOWER LEG LIMITED TO BREAKDOWN OF SKIN (HCC): ICD-10-CM

## 2021-11-08 DIAGNOSIS — L97.921 NON-PRESSURE CHRONIC ULCER OF UNSPECIFIED PART OF LEFT LOWER LEG LIMITED TO BREAKDOWN OF SKIN (HCC): ICD-10-CM

## 2021-11-08 PROCEDURE — 87205 SMEAR GRAM STAIN: CPT | Performed by: FAMILY MEDICINE

## 2021-11-08 PROCEDURE — 87070 CULTURE OTHR SPECIMN AEROBIC: CPT | Performed by: FAMILY MEDICINE

## 2021-11-10 LAB
BACTERIA SPEC AEROBE CULT: ABNORMAL
GRAM STN SPEC: ABNORMAL

## 2022-01-28 ENCOUNTER — APPOINTMENT (OUTPATIENT)
Dept: GENERAL RADIOLOGY | Facility: HOSPITAL | Age: 87
End: 2022-01-28

## 2022-01-28 ENCOUNTER — HOSPITAL ENCOUNTER (EMERGENCY)
Facility: HOSPITAL | Age: 87
Discharge: HOME | End: 2022-01-28
Attending: EMERGENCY MEDICINE | Admitting: EMERGENCY MEDICINE

## 2022-01-28 VITALS
HEIGHT: 66 IN | RESPIRATION RATE: 18 BRPM | OXYGEN SATURATION: 95 % | BODY MASS INDEX: 28.93 KG/M2 | WEIGHT: 180 LBS | HEART RATE: 85 BPM | DIASTOLIC BLOOD PRESSURE: 70 MMHG | SYSTOLIC BLOOD PRESSURE: 154 MMHG | TEMPERATURE: 98.3 F

## 2022-01-28 DIAGNOSIS — S81.801A WOUND OF RIGHT LOWER EXTREMITY, INITIAL ENCOUNTER: Primary | ICD-10-CM

## 2022-01-28 DIAGNOSIS — N39.0 URINARY TRACT INFECTION WITHOUT HEMATURIA, SITE UNSPECIFIED: ICD-10-CM

## 2022-01-28 LAB
ALBUMIN SERPL-MCNC: 3.2 G/DL (ref 3.5–5.2)
ALBUMIN/GLOB SERPL: 0.7 G/DL
ALP SERPL-CCNC: 57 U/L (ref 39–117)
ALT SERPL W P-5'-P-CCNC: 11 U/L (ref 1–33)
ANION GAP SERPL CALCULATED.3IONS-SCNC: 10.9 MMOL/L (ref 5–15)
AST SERPL-CCNC: 20 U/L (ref 1–32)
BACTERIA UR QL AUTO: ABNORMAL /HPF
BASOPHILS # BLD AUTO: 0.04 10*3/MM3 (ref 0–0.2)
BASOPHILS NFR BLD AUTO: 0.4 % (ref 0–1.5)
BILIRUB SERPL-MCNC: 1.5 MG/DL (ref 0–1.2)
BILIRUB UR QL STRIP: NEGATIVE
BUN SERPL-MCNC: 26 MG/DL (ref 8–23)
BUN/CREAT SERPL: 22.4 (ref 7–25)
CALCIUM SPEC-SCNC: 9.4 MG/DL (ref 8.6–10.5)
CHLORIDE SERPL-SCNC: 98 MMOL/L (ref 98–107)
CLARITY UR: ABNORMAL
CO2 SERPL-SCNC: 29.1 MMOL/L (ref 22–29)
COLOR UR: YELLOW
CREAT SERPL-MCNC: 1.16 MG/DL (ref 0.57–1)
DEPRECATED RDW RBC AUTO: 50.8 FL (ref 37–54)
EOSINOPHIL # BLD AUTO: 0.1 10*3/MM3 (ref 0–0.4)
EOSINOPHIL NFR BLD AUTO: 0.9 % (ref 0.3–6.2)
ERYTHROCYTE [DISTWIDTH] IN BLOOD BY AUTOMATED COUNT: 14.5 % (ref 12.3–15.4)
GFR SERPL CREATININE-BSD FRML MDRD: 53 ML/MIN/1.73
GLOBULIN UR ELPH-MCNC: 4.4 GM/DL
GLUCOSE SERPL-MCNC: 95 MG/DL (ref 65–99)
GLUCOSE UR STRIP-MCNC: NEGATIVE MG/DL
HCT VFR BLD AUTO: 41.5 % (ref 34–46.6)
HGB BLD-MCNC: 13.3 G/DL (ref 12–15.9)
HGB UR QL STRIP.AUTO: ABNORMAL
HOLD SPECIMEN: NORMAL
HYALINE CASTS UR QL AUTO: ABNORMAL /LPF
IMM GRANULOCYTES # BLD AUTO: 0.05 10*3/MM3 (ref 0–0.05)
IMM GRANULOCYTES NFR BLD AUTO: 0.5 % (ref 0–0.5)
KETONES UR QL STRIP: NEGATIVE
LEUKOCYTE ESTERASE UR QL STRIP.AUTO: ABNORMAL
LYMPHOCYTES # BLD AUTO: 1.45 10*3/MM3 (ref 0.7–3.1)
LYMPHOCYTES NFR BLD AUTO: 13.2 % (ref 19.6–45.3)
MCH RBC QN AUTO: 30.6 PG (ref 26.6–33)
MCHC RBC AUTO-ENTMCNC: 32 G/DL (ref 31.5–35.7)
MCV RBC AUTO: 95.4 FL (ref 79–97)
MONOCYTES # BLD AUTO: 1.45 10*3/MM3 (ref 0.1–0.9)
MONOCYTES NFR BLD AUTO: 13.2 % (ref 5–12)
NEUTROPHILS NFR BLD AUTO: 7.89 10*3/MM3 (ref 1.7–7)
NEUTROPHILS NFR BLD AUTO: 71.8 % (ref 42.7–76)
NITRITE UR QL STRIP: NEGATIVE
NRBC BLD AUTO-RTO: 0 /100 WBC (ref 0–0.2)
PH UR STRIP.AUTO: 7.5 [PH] (ref 5–8)
PLATELET # BLD AUTO: 178 10*3/MM3 (ref 140–450)
PMV BLD AUTO: 10.5 FL (ref 6–12)
POTASSIUM SERPL-SCNC: 4 MMOL/L (ref 3.5–5.2)
PROT SERPL-MCNC: 7.6 G/DL (ref 6–8.5)
PROT UR QL STRIP: NEGATIVE
RBC # BLD AUTO: 4.35 10*6/MM3 (ref 3.77–5.28)
RBC # UR STRIP: ABNORMAL /HPF
REF LAB TEST METHOD: ABNORMAL
SODIUM SERPL-SCNC: 138 MMOL/L (ref 136–145)
SP GR UR STRIP: 1.01 (ref 1–1.03)
SQUAMOUS #/AREA URNS HPF: ABNORMAL /HPF
UROBILINOGEN UR QL STRIP: ABNORMAL
WBC # UR STRIP: ABNORMAL /HPF
WBC NRBC COR # BLD: 10.98 10*3/MM3 (ref 3.4–10.8)
WHOLE BLOOD HOLD SPECIMEN: NORMAL
WHOLE BLOOD HOLD SPECIMEN: NORMAL

## 2022-01-28 PROCEDURE — 73590 X-RAY EXAM OF LOWER LEG: CPT

## 2022-01-28 PROCEDURE — 87070 CULTURE OTHR SPECIMN AEROBIC: CPT | Performed by: PHYSICIAN ASSISTANT

## 2022-01-28 PROCEDURE — 99283 EMERGENCY DEPT VISIT LOW MDM: CPT

## 2022-01-28 PROCEDURE — 36415 COLL VENOUS BLD VENIPUNCTURE: CPT

## 2022-01-28 PROCEDURE — 85025 COMPLETE CBC W/AUTO DIFF WBC: CPT | Performed by: PHYSICIAN ASSISTANT

## 2022-01-28 PROCEDURE — 80053 COMPREHEN METABOLIC PANEL: CPT | Performed by: PHYSICIAN ASSISTANT

## 2022-01-28 PROCEDURE — 81001 URINALYSIS AUTO W/SCOPE: CPT | Performed by: PHYSICIAN ASSISTANT

## 2022-01-28 PROCEDURE — 87077 CULTURE AEROBIC IDENTIFY: CPT | Performed by: PHYSICIAN ASSISTANT

## 2022-01-28 PROCEDURE — 87186 SC STD MICRODIL/AGAR DIL: CPT | Performed by: PHYSICIAN ASSISTANT

## 2022-01-28 PROCEDURE — 87205 SMEAR GRAM STAIN: CPT | Performed by: PHYSICIAN ASSISTANT

## 2022-01-28 RX ORDER — SODIUM CHLORIDE 0.9 % (FLUSH) 0.9 %
10 SYRINGE (ML) INJECTION AS NEEDED
Status: DISCONTINUED | OUTPATIENT
Start: 2022-01-28 | End: 2022-01-29 | Stop reason: HOSPADM

## 2022-01-28 RX ORDER — DOXYCYCLINE 100 MG/1
100 CAPSULE ORAL ONCE
Status: COMPLETED | OUTPATIENT
Start: 2022-01-28 | End: 2022-01-28

## 2022-01-28 RX ORDER — DOXYCYCLINE 100 MG/1
100 CAPSULE ORAL 2 TIMES DAILY
Qty: 14 CAPSULE | Refills: 0 | Status: SHIPPED | OUTPATIENT
Start: 2022-01-28 | End: 2022-02-04

## 2022-01-28 RX ADMIN — DOXYCYCLINE 100 MG: 100 CAPSULE ORAL at 21:36

## 2022-01-30 LAB
BACTERIA SPEC AEROBE CULT: ABNORMAL
GRAM STN SPEC: ABNORMAL
GRAM STN SPEC: ABNORMAL

## 2022-03-25 ENCOUNTER — HOSPITAL ENCOUNTER (EMERGENCY)
Facility: HOSPITAL | Age: 87
Discharge: HOME | End: 2022-03-25
Attending: EMERGENCY MEDICINE | Admitting: EMERGENCY MEDICINE

## 2022-03-25 VITALS
OXYGEN SATURATION: 95 % | SYSTOLIC BLOOD PRESSURE: 150 MMHG | HEART RATE: 75 BPM | DIASTOLIC BLOOD PRESSURE: 94 MMHG | TEMPERATURE: 98.6 F | RESPIRATION RATE: 18 BRPM

## 2022-03-25 DIAGNOSIS — I89.0 LYMPHEDEMA: Primary | ICD-10-CM

## 2022-03-25 DIAGNOSIS — Z51.89 VISIT FOR WOUND CHECK: ICD-10-CM

## 2022-03-25 LAB
ALBUMIN SERPL-MCNC: 2.8 G/DL (ref 3.5–5.2)
ALBUMIN/GLOB SERPL: 0.7 G/DL
ALP SERPL-CCNC: 55 U/L (ref 39–117)
ALT SERPL W P-5'-P-CCNC: 8 U/L (ref 1–33)
ANION GAP SERPL CALCULATED.3IONS-SCNC: 9.3 MMOL/L (ref 5–15)
AST SERPL-CCNC: 18 U/L (ref 1–32)
BASOPHILS # BLD AUTO: 0.03 10*3/MM3 (ref 0–0.2)
BASOPHILS NFR BLD AUTO: 0.4 % (ref 0–1.5)
BILIRUB SERPL-MCNC: 1 MG/DL (ref 0–1.2)
BUN SERPL-MCNC: 20 MG/DL (ref 8–23)
BUN/CREAT SERPL: 15.9 (ref 7–25)
CALCIUM SPEC-SCNC: 8.7 MG/DL (ref 8.6–10.5)
CHLORIDE SERPL-SCNC: 99 MMOL/L (ref 98–107)
CO2 SERPL-SCNC: 29.7 MMOL/L (ref 22–29)
CREAT SERPL-MCNC: 1.26 MG/DL (ref 0.57–1)
DEPRECATED RDW RBC AUTO: 57.1 FL (ref 37–54)
EGFRCR SERPLBLD CKD-EPI 2021: 40.9 ML/MIN/1.73
EOSINOPHIL # BLD AUTO: 0.16 10*3/MM3 (ref 0–0.4)
EOSINOPHIL NFR BLD AUTO: 1.9 % (ref 0.3–6.2)
ERYTHROCYTE [DISTWIDTH] IN BLOOD BY AUTOMATED COUNT: 16.9 % (ref 12.3–15.4)
GLOBULIN UR ELPH-MCNC: 3.8 GM/DL
GLUCOSE SERPL-MCNC: 116 MG/DL (ref 65–99)
HCT VFR BLD AUTO: 34.3 % (ref 34–46.6)
HGB BLD-MCNC: 11.5 G/DL (ref 12–15.9)
HOLD SPECIMEN: NORMAL
HOLD SPECIMEN: NORMAL
IMM GRANULOCYTES # BLD AUTO: 0.02 10*3/MM3 (ref 0–0.05)
IMM GRANULOCYTES NFR BLD AUTO: 0.2 % (ref 0–0.5)
LYMPHOCYTES # BLD AUTO: 1.7 10*3/MM3 (ref 0.7–3.1)
LYMPHOCYTES NFR BLD AUTO: 20.7 % (ref 19.6–45.3)
MCH RBC QN AUTO: 31 PG (ref 26.6–33)
MCHC RBC AUTO-ENTMCNC: 33.5 G/DL (ref 31.5–35.7)
MCV RBC AUTO: 92.5 FL (ref 79–97)
MONOCYTES # BLD AUTO: 1.17 10*3/MM3 (ref 0.1–0.9)
MONOCYTES NFR BLD AUTO: 14.2 % (ref 5–12)
NEUTROPHILS NFR BLD AUTO: 5.15 10*3/MM3 (ref 1.7–7)
NEUTROPHILS NFR BLD AUTO: 62.6 % (ref 42.7–76)
NRBC BLD AUTO-RTO: 0 /100 WBC (ref 0–0.2)
PLATELET # BLD AUTO: 210 10*3/MM3 (ref 140–450)
PMV BLD AUTO: 11.4 FL (ref 6–12)
POTASSIUM SERPL-SCNC: 4.1 MMOL/L (ref 3.5–5.2)
PROT SERPL-MCNC: 6.6 G/DL (ref 6–8.5)
RBC # BLD AUTO: 3.71 10*6/MM3 (ref 3.77–5.28)
SODIUM SERPL-SCNC: 138 MMOL/L (ref 136–145)
WBC NRBC COR # BLD: 8.23 10*3/MM3 (ref 3.4–10.8)
WHOLE BLOOD HOLD SPECIMEN: NORMAL
WHOLE BLOOD HOLD SPECIMEN: NORMAL

## 2022-03-25 PROCEDURE — 80053 COMPREHEN METABOLIC PANEL: CPT | Performed by: EMERGENCY MEDICINE

## 2022-03-25 PROCEDURE — 85025 COMPLETE CBC W/AUTO DIFF WBC: CPT | Performed by: EMERGENCY MEDICINE

## 2022-03-25 PROCEDURE — 99283 EMERGENCY DEPT VISIT LOW MDM: CPT

## 2022-03-25 RX ORDER — SODIUM CHLORIDE 0.9 % (FLUSH) 0.9 %
10 SYRINGE (ML) INJECTION AS NEEDED
Status: DISCONTINUED | OUTPATIENT
Start: 2022-03-25 | End: 2022-03-26 | Stop reason: HOSPADM

## 2022-03-25 RX ORDER — CEPHALEXIN 500 MG/1
500 CAPSULE ORAL 4 TIMES DAILY
Qty: 28 CAPSULE | Refills: 0 | Status: SHIPPED | OUTPATIENT
Start: 2022-03-25 | End: 2022-03-25 | Stop reason: SDUPTHER

## 2022-03-25 RX ORDER — CEPHALEXIN 500 MG/1
500 CAPSULE ORAL 4 TIMES DAILY
Qty: 28 CAPSULE | Refills: 0 | Status: SHIPPED | OUTPATIENT
Start: 2022-03-25 | End: 2022-04-01

## 2022-03-26 NOTE — ED PROVIDER NOTES
"Subjective   89-year-old female presents to the ED for chief complaint of wound check.  Patient has lymphedema to her bilateral lower extremities and has chronic wounds that she sees home health for.  Currently home health up the wound.  Worse today so they sent her to the ED for evaluation of these wounds.  The wound on her right leg is oozing some blood.  No fever or chills.  No chest pain or shortness of breath.  No cough or wheeze.  No other complaints at this time.          Review of Systems   Skin: Positive for wound.   All other systems reviewed and are negative.      Past Medical History:   Diagnosis Date   • Hyperlipidemia    • Hypertension    • Myocardial infarction (HCC)     family reports PCP stated pt had \"minor heart attack\"       No Known Allergies    Past Surgical History:   Procedure Laterality Date   • HYSTERECTOMY         History reviewed. No pertinent family history.    Social History     Socioeconomic History   • Marital status:    Tobacco Use   • Smoking status: Never Smoker   Vaping Use   • Vaping Use: Never used   Substance and Sexual Activity   • Alcohol use: No   • Drug use: No   • Sexual activity: Defer           Objective   Physical Exam  Vitals and nursing note reviewed.   Constitutional:       General: She is not in acute distress.     Appearance: She is well-developed. She is not diaphoretic.      Comments: Elderly-appearing   HENT:      Head: Normocephalic and atraumatic.      Nose: Nose normal.   Eyes:      Conjunctiva/sclera: Conjunctivae normal.   Cardiovascular:      Rate and Rhythm: Normal rate and regular rhythm.   Pulmonary:      Effort: Pulmonary effort is normal. No respiratory distress.      Breath sounds: Normal breath sounds.   Abdominal:      General: There is no distension.      Palpations: Abdomen is soft.      Tenderness: There is no abdominal tenderness. There is no guarding.   Musculoskeletal:         General: No deformity.   Skin:     Comments: Chronic edema " and skin changes to the bilateral lower extremities consistent with lymphedema.  Blood oozing from the posterior aspect of the right lower leg.   Neurological:      Mental Status: She is alert and oriented to person, place, and time.      Cranial Nerves: No cranial nerve deficit.         Procedures           ED Course                                                 MDM  Well-appearing very pleasant 89-year-old female presents to the ED for wound check.  Patient has chronic lymphedema to her bilateral lower extremities.  She does have home health care who helps dress and clean her wounds.  On evaluation she has some oozing from the posterior aspect of her right lower distal leg but no signs of erythema or warmth consistent with a cellulitis.  Labs are reassuring.  She is nonambulatory on her legs at this point in her life.  Shared decision making with the son.  Patient is appropriate for discharge back home as needed.  Patient agreeable with this plan.    Final diagnoses:   Lymphedema   Visit for wound check       ED Disposition  ED Disposition     ED Disposition   Discharge    Condition   Stable    Comment   --             Domenica Fernandez, DO  [address] [facility] [address] [phone]               Medication List      Changed    * cephalexin 500 MG capsule  Commonly known as: KEFLEX  Take 1 capsule by mouth 3 (Three) Times a Day.  What changed: Another medication with the same name was added. Make sure you understand how and when to take each.     * cephalexin 500 MG capsule  Commonly known as: KEFLEX  Take 1 capsule by mouth 4 (Four) Times a Day for 7 days.  What changed: You were already taking a medication with the same name, and this prescription was added. Make sure you understand how and when to take each.         * This list has 2 medication(s) that are the same as other medications prescribed for you. Read the directions carefully, and ask your doctor or other care provider to review them  with you.               Where to Get Your Medications      These medications were sent to [facility] [address] [place] [address] - [address] AT [facility]. - [phone] [facility] [phone]   [address] [facility] [facility] [address]    Phone: [phone]   · cephalexin 500 MG capsule          Tarun Bai, DO  03/26/22 0052

## 2022-09-07 ENCOUNTER — APPOINTMENT (OUTPATIENT)
Dept: CT IMAGING | Facility: HOSPITAL | Age: 87
End: 2022-09-07

## 2022-09-07 ENCOUNTER — HOSPITAL ENCOUNTER (OUTPATIENT)
Facility: HOSPITAL | Age: 87
Setting detail: OBSERVATION
Discharge: HOSPICE | End: 2022-09-09
Attending: EMERGENCY MEDICINE | Admitting: STUDENT IN AN ORGANIZED HEALTH CARE EDUCATION/TRAINING PROGRAM

## 2022-09-07 DIAGNOSIS — L08.9 PRESSURE INJURY, STAGE 4, WITH INFECTION: Primary | ICD-10-CM

## 2022-09-07 DIAGNOSIS — L89.94 PRESSURE INJURY, STAGE 4, WITH INFECTION: Primary | ICD-10-CM

## 2022-09-07 LAB
ALBUMIN SERPL-MCNC: 2.7 G/DL (ref 3.5–5.2)
ALBUMIN/GLOB SERPL: 0.8 G/DL
ALP SERPL-CCNC: 58 U/L (ref 39–117)
ALT SERPL W P-5'-P-CCNC: 9 U/L (ref 1–33)
ANION GAP SERPL CALCULATED.3IONS-SCNC: 12.2 MMOL/L (ref 5–15)
AST SERPL-CCNC: 19 U/L (ref 1–32)
BASOPHILS # BLD AUTO: 0.02 10*3/MM3 (ref 0–0.2)
BASOPHILS NFR BLD AUTO: 0.2 % (ref 0–1.5)
BILIRUB SERPL-MCNC: 0.9 MG/DL (ref 0–1.2)
BUN SERPL-MCNC: 29 MG/DL (ref 8–23)
BUN/CREAT SERPL: 23.2 (ref 7–25)
CALCIUM SPEC-SCNC: 9.2 MG/DL (ref 8.2–9.6)
CHLORIDE SERPL-SCNC: 103 MMOL/L (ref 98–107)
CO2 SERPL-SCNC: 27.8 MMOL/L (ref 22–29)
CREAT SERPL-MCNC: 1.25 MG/DL (ref 0.57–1)
CRP SERPL-MCNC: 13.02 MG/DL (ref 0–0.5)
D-LACTATE SERPL-SCNC: 1.7 MMOL/L (ref 0.5–2)
DEPRECATED RDW RBC AUTO: 57.6 FL (ref 37–54)
EGFRCR SERPLBLD CKD-EPI 2021: 41 ML/MIN/1.73
EOSINOPHIL # BLD AUTO: 0.06 10*3/MM3 (ref 0–0.4)
EOSINOPHIL NFR BLD AUTO: 0.5 % (ref 0.3–6.2)
ERYTHROCYTE [DISTWIDTH] IN BLOOD BY AUTOMATED COUNT: 17 % (ref 12.3–15.4)
ERYTHROCYTE [SEDIMENTATION RATE] IN BLOOD: 3 MM/HR (ref 0–30)
GLOBULIN UR ELPH-MCNC: 3.6 GM/DL
GLUCOSE SERPL-MCNC: 96 MG/DL (ref 65–99)
HCT VFR BLD AUTO: 33.1 % (ref 34–46.6)
HGB BLD-MCNC: 11.3 G/DL (ref 12–15.9)
IMM GRANULOCYTES # BLD AUTO: 0.07 10*3/MM3 (ref 0–0.05)
IMM GRANULOCYTES NFR BLD AUTO: 0.6 % (ref 0–0.5)
LYMPHOCYTES # BLD AUTO: 1.37 10*3/MM3 (ref 0.7–3.1)
LYMPHOCYTES NFR BLD AUTO: 12 % (ref 19.6–45.3)
MCH RBC QN AUTO: 31.7 PG (ref 26.6–33)
MCHC RBC AUTO-ENTMCNC: 34.1 G/DL (ref 31.5–35.7)
MCV RBC AUTO: 92.7 FL (ref 79–97)
MONOCYTES # BLD AUTO: 0.89 10*3/MM3 (ref 0.1–0.9)
MONOCYTES NFR BLD AUTO: 7.8 % (ref 5–12)
NEUTROPHILS NFR BLD AUTO: 78.9 % (ref 42.7–76)
NEUTROPHILS NFR BLD AUTO: 9.03 10*3/MM3 (ref 1.7–7)
NRBC BLD AUTO-RTO: 0 /100 WBC (ref 0–0.2)
PLATELET # BLD AUTO: 162 10*3/MM3 (ref 140–450)
PMV BLD AUTO: 11.1 FL (ref 6–12)
POTASSIUM SERPL-SCNC: 3.7 MMOL/L (ref 3.5–5.2)
PROCALCITONIN SERPL-MCNC: 0.43 NG/ML (ref 0–0.25)
PROT SERPL-MCNC: 6.3 G/DL (ref 6–8.5)
RBC # BLD AUTO: 3.57 10*6/MM3 (ref 3.77–5.28)
SODIUM SERPL-SCNC: 143 MMOL/L (ref 136–145)
WBC NRBC COR # BLD: 11.44 10*3/MM3 (ref 3.4–10.8)

## 2022-09-07 PROCEDURE — 85025 COMPLETE CBC W/AUTO DIFF WBC: CPT | Performed by: EMERGENCY MEDICINE

## 2022-09-07 PROCEDURE — G0378 HOSPITAL OBSERVATION PER HR: HCPCS

## 2022-09-07 PROCEDURE — 87040 BLOOD CULTURE FOR BACTERIA: CPT | Performed by: EMERGENCY MEDICINE

## 2022-09-07 PROCEDURE — 83605 ASSAY OF LACTIC ACID: CPT | Performed by: EMERGENCY MEDICINE

## 2022-09-07 PROCEDURE — 86140 C-REACTIVE PROTEIN: CPT | Performed by: EMERGENCY MEDICINE

## 2022-09-07 PROCEDURE — 25010000002 VANCOMYCIN 5 G RECONSTITUTED SOLUTION: Performed by: EMERGENCY MEDICINE

## 2022-09-07 PROCEDURE — 80053 COMPREHEN METABOLIC PANEL: CPT | Performed by: EMERGENCY MEDICINE

## 2022-09-07 PROCEDURE — 99219 PR INITIAL OBSERVATION CARE/DAY 50 MINUTES: CPT | Performed by: INTERNAL MEDICINE

## 2022-09-07 PROCEDURE — 25010000002 IOPAMIDOL 61 % SOLUTION: Performed by: EMERGENCY MEDICINE

## 2022-09-07 PROCEDURE — 25010000002 PIPERACILLIN SOD-TAZOBACTAM PER 1 G: Performed by: EMERGENCY MEDICINE

## 2022-09-07 PROCEDURE — 85652 RBC SED RATE AUTOMATED: CPT | Performed by: EMERGENCY MEDICINE

## 2022-09-07 PROCEDURE — 96366 THER/PROPH/DIAG IV INF ADDON: CPT

## 2022-09-07 PROCEDURE — 99284 EMERGENCY DEPT VISIT MOD MDM: CPT

## 2022-09-07 PROCEDURE — 96365 THER/PROPH/DIAG IV INF INIT: CPT

## 2022-09-07 PROCEDURE — 96367 TX/PROPH/DG ADDL SEQ IV INF: CPT

## 2022-09-07 PROCEDURE — 72193 CT PELVIS W/DYE: CPT

## 2022-09-07 PROCEDURE — 84145 PROCALCITONIN (PCT): CPT | Performed by: EMERGENCY MEDICINE

## 2022-09-07 PROCEDURE — 36415 COLL VENOUS BLD VENIPUNCTURE: CPT

## 2022-09-07 RX ORDER — ACETAMINOPHEN 325 MG/1
650 TABLET ORAL EVERY 8 HOURS
COMMUNITY

## 2022-09-07 RX ORDER — POLYETHYLENE GLYCOL 3350 17 G/17G
17 POWDER, FOR SOLUTION ORAL DAILY PRN
Status: DISCONTINUED | OUTPATIENT
Start: 2022-09-07 | End: 2022-09-09 | Stop reason: HOSPADM

## 2022-09-07 RX ORDER — SODIUM CHLORIDE 0.9 % (FLUSH) 0.9 %
10 SYRINGE (ML) INJECTION AS NEEDED
Status: DISCONTINUED | OUTPATIENT
Start: 2022-09-07 | End: 2022-09-09 | Stop reason: HOSPADM

## 2022-09-07 RX ORDER — VALSARTAN 80 MG/1
320 TABLET ORAL DAILY
Status: DISCONTINUED | OUTPATIENT
Start: 2022-09-08 | End: 2022-09-09 | Stop reason: HOSPADM

## 2022-09-07 RX ORDER — BISACODYL 10 MG
10 SUPPOSITORY, RECTAL RECTAL DAILY PRN
Status: DISCONTINUED | OUTPATIENT
Start: 2022-09-07 | End: 2022-09-09 | Stop reason: HOSPADM

## 2022-09-07 RX ORDER — ACETAMINOPHEN 160 MG/5ML
650 SOLUTION ORAL EVERY 4 HOURS PRN
Status: DISCONTINUED | OUTPATIENT
Start: 2022-09-07 | End: 2022-09-09 | Stop reason: HOSPADM

## 2022-09-07 RX ORDER — BISACODYL 5 MG/1
5 TABLET, DELAYED RELEASE ORAL DAILY PRN
Status: DISCONTINUED | OUTPATIENT
Start: 2022-09-07 | End: 2022-09-09 | Stop reason: HOSPADM

## 2022-09-07 RX ORDER — HYDRALAZINE HYDROCHLORIDE 20 MG/ML
10 INJECTION INTRAMUSCULAR; INTRAVENOUS EVERY 6 HOURS PRN
Status: DISCONTINUED | OUTPATIENT
Start: 2022-09-07 | End: 2022-09-09 | Stop reason: HOSPADM

## 2022-09-07 RX ORDER — ATORVASTATIN CALCIUM 40 MG/1
40 TABLET, FILM COATED ORAL DAILY
Status: DISCONTINUED | OUTPATIENT
Start: 2022-09-08 | End: 2022-09-09 | Stop reason: HOSPADM

## 2022-09-07 RX ORDER — AMOXICILLIN 250 MG
2 CAPSULE ORAL 2 TIMES DAILY
Status: DISCONTINUED | OUTPATIENT
Start: 2022-09-07 | End: 2022-09-09 | Stop reason: HOSPADM

## 2022-09-07 RX ORDER — ACETAMINOPHEN 650 MG/1
650 SUPPOSITORY RECTAL EVERY 4 HOURS PRN
Status: DISCONTINUED | OUTPATIENT
Start: 2022-09-07 | End: 2022-09-09 | Stop reason: HOSPADM

## 2022-09-07 RX ORDER — SODIUM CHLORIDE 0.9 % (FLUSH) 0.9 %
10 SYRINGE (ML) INJECTION EVERY 12 HOURS SCHEDULED
Status: DISCONTINUED | OUTPATIENT
Start: 2022-09-07 | End: 2022-09-09 | Stop reason: HOSPADM

## 2022-09-07 RX ORDER — NALOXONE HCL 0.4 MG/ML
0.4 VIAL (ML) INJECTION
Status: DISCONTINUED | OUTPATIENT
Start: 2022-09-07 | End: 2022-09-09 | Stop reason: HOSPADM

## 2022-09-07 RX ORDER — CHOLECALCIFEROL (VITAMIN D3) 125 MCG
5 CAPSULE ORAL NIGHTLY PRN
Status: DISCONTINUED | OUTPATIENT
Start: 2022-09-07 | End: 2022-09-09 | Stop reason: HOSPADM

## 2022-09-07 RX ORDER — ONDANSETRON 2 MG/ML
4 INJECTION INTRAMUSCULAR; INTRAVENOUS EVERY 6 HOURS PRN
Status: DISCONTINUED | OUTPATIENT
Start: 2022-09-07 | End: 2022-09-09 | Stop reason: HOSPADM

## 2022-09-07 RX ORDER — CARVEDILOL 12.5 MG/1
12.5 TABLET ORAL 2 TIMES DAILY WITH MEALS
Status: DISCONTINUED | OUTPATIENT
Start: 2022-09-07 | End: 2022-09-09 | Stop reason: HOSPADM

## 2022-09-07 RX ORDER — ACETAMINOPHEN 325 MG/1
650 TABLET ORAL EVERY 4 HOURS PRN
Status: DISCONTINUED | OUTPATIENT
Start: 2022-09-07 | End: 2022-09-09 | Stop reason: HOSPADM

## 2022-09-07 RX ORDER — MORPHINE SULFATE 2 MG/ML
1 INJECTION, SOLUTION INTRAMUSCULAR; INTRAVENOUS EVERY 4 HOURS PRN
Status: DISCONTINUED | OUTPATIENT
Start: 2022-09-07 | End: 2022-09-09 | Stop reason: HOSPADM

## 2022-09-07 RX ADMIN — Medication 5 MG: at 22:54

## 2022-09-07 RX ADMIN — IOPAMIDOL 100 ML: 612 INJECTION, SOLUTION INTRAVENOUS at 17:21

## 2022-09-07 RX ADMIN — TAZOBACTAM SODIUM AND PIPERACILLIN SODIUM 3.38 G: 375; 3 INJECTION, SOLUTION INTRAVENOUS at 16:57

## 2022-09-07 RX ADMIN — VANCOMYCIN HYDROCHLORIDE 1750 MG: 5 INJECTION, POWDER, LYOPHILIZED, FOR SOLUTION INTRAVENOUS at 17:39

## 2022-09-07 RX ADMIN — SENNOSIDES AND DOCUSATE SODIUM 2 TABLET: 50; 8.6 TABLET ORAL at 22:54

## 2022-09-07 RX ADMIN — CARVEDILOL 12.5 MG: 12.5 TABLET, FILM COATED ORAL at 22:54

## 2022-09-07 RX ADMIN — APIXABAN 2.5 MG: 2.5 TABLET, FILM COATED ORAL at 22:54

## 2022-09-07 NOTE — ED PROVIDER NOTES
"Subjective   PIT    89-year-old female presenting for wound check.  She is brought in by her son who provides most the history.  He notes that she has had a wound to her buttocks for several weeks that has gotten worse over the last week or so.  No other complaints or concerns.          Review of Systems   Skin: Positive for wound.   All other systems reviewed and are negative.      Past Medical History:   Diagnosis Date   • Hyperlipidemia    • Hypertension    • Myocardial infarction (HCC)     family reports PCP stated pt had \"minor heart attack\"       No Known Allergies    Past Surgical History:   Procedure Laterality Date   • HYSTERECTOMY         No family history on file.    Social History     Socioeconomic History   • Marital status:    Tobacco Use   • Smoking status: Never Smoker   Vaping Use   • Vaping Use: Never used   Substance and Sexual Activity   • Alcohol use: No   • Drug use: No   • Sexual activity: Defer           Objective   Physical Exam  Constitutional:       General: She is not in acute distress.     Appearance: She is not toxic-appearing or diaphoretic.      Comments: Chronically ill-appearing, elderly   HENT:      Head: Normocephalic and atraumatic.      Right Ear: External ear normal.      Left Ear: External ear normal.      Nose: Nose normal.      Mouth/Throat:      Mouth: Mucous membranes are moist.      Pharynx: Oropharynx is clear.   Eyes:      Extraocular Movements: Extraocular movements intact.      Conjunctiva/sclera: Conjunctivae normal.      Pupils: Pupils are equal, round, and reactive to light.   Cardiovascular:      Rate and Rhythm: Normal rate and regular rhythm.      Pulses: Normal pulses.      Heart sounds: Normal heart sounds.   Pulmonary:      Effort: Pulmonary effort is normal. No respiratory distress.      Breath sounds: Normal breath sounds.   Abdominal:      General: Bowel sounds are normal. There is no distension.      Tenderness: There is no abdominal tenderness. "   Musculoskeletal:         General: No tenderness or deformity. Normal range of motion.      Cervical back: Normal range of motion.      Comments: Mild lower extremity edema   Skin:     General: Skin is warm and dry.      Capillary Refill: Capillary refill takes less than 2 seconds.      Findings: No rash.      Comments: Several small sacral decubitus ulcers, fairly superficial, there is a large ulcer to the right side with purulent foul-smelling drainage, this tracks fairly deeply   Neurological:      General: No focal deficit present.      Mental Status: She is alert and oriented to person, place, and time.   Psychiatric:         Mood and Affect: Mood normal.         Behavior: Behavior normal.         Procedures           ED Course                                           MDM  Number of Diagnoses or Management Options  Pressure injury, stage 4, with infection (HCC)  Diagnosis management comments: 89-year-old female with ulcer, wound check.  Chronically ill-appearing frail lady with exam as above.  Her vital signs are normal.  Her exam is concerning for a large decubitus ulcer of her buttocks with purulent discharge.  Will obtain labs and imaging to assess the extent of the ulcer.  We will start antibiotics.  Disposition pending anticipate admission.    DDx: Decubitus ulcer, cellulitis, osteomyelitis    Lab work reveals mildly elevated inflammatory markers.  CT scan reveals a stage IV decubitus ulcer with surrounding cellulitis.  No abscess or convincing evidence of osteomyelitis.  Discussed with Dr. Suggs who graciously accepts patient for admission.       Amount and/or Complexity of Data Reviewed  Decide to obtain previous medical records or to obtain history from someone other than the patient: yes        Final diagnoses:   Pressure injury, stage 4, with infection (HCC)          Dom Saini MD  09/07/22 0259

## 2022-09-08 LAB
ANION GAP SERPL CALCULATED.3IONS-SCNC: 13.5 MMOL/L (ref 5–15)
BASOPHILS # BLD AUTO: 0.01 10*3/MM3 (ref 0–0.2)
BASOPHILS NFR BLD AUTO: 0.1 % (ref 0–1.5)
BUN SERPL-MCNC: 25 MG/DL (ref 8–23)
BUN/CREAT SERPL: 22.5 (ref 7–25)
CALCIUM SPEC-SCNC: 8.8 MG/DL (ref 8.2–9.6)
CHLORIDE SERPL-SCNC: 103 MMOL/L (ref 98–107)
CO2 SERPL-SCNC: 24.5 MMOL/L (ref 22–29)
CREAT SERPL-MCNC: 1.11 MG/DL (ref 0.57–1)
DEPRECATED RDW RBC AUTO: 55.6 FL (ref 37–54)
EGFRCR SERPLBLD CKD-EPI 2021: 47.3 ML/MIN/1.73
EOSINOPHIL # BLD AUTO: 0.08 10*3/MM3 (ref 0–0.4)
EOSINOPHIL NFR BLD AUTO: 0.7 % (ref 0.3–6.2)
ERYTHROCYTE [DISTWIDTH] IN BLOOD BY AUTOMATED COUNT: 16.8 % (ref 12.3–15.4)
GLUCOSE SERPL-MCNC: 101 MG/DL (ref 65–99)
HCT VFR BLD AUTO: 30.7 % (ref 34–46.6)
HGB BLD-MCNC: 10.6 G/DL (ref 12–15.9)
IMM GRANULOCYTES # BLD AUTO: 0.05 10*3/MM3 (ref 0–0.05)
IMM GRANULOCYTES NFR BLD AUTO: 0.5 % (ref 0–0.5)
LYMPHOCYTES # BLD AUTO: 1.34 10*3/MM3 (ref 0.7–3.1)
LYMPHOCYTES NFR BLD AUTO: 12.2 % (ref 19.6–45.3)
MCH RBC QN AUTO: 31.5 PG (ref 26.6–33)
MCHC RBC AUTO-ENTMCNC: 34.5 G/DL (ref 31.5–35.7)
MCV RBC AUTO: 91.1 FL (ref 79–97)
MONOCYTES # BLD AUTO: 0.67 10*3/MM3 (ref 0.1–0.9)
MONOCYTES NFR BLD AUTO: 6.1 % (ref 5–12)
NEUTROPHILS NFR BLD AUTO: 8.81 10*3/MM3 (ref 1.7–7)
NEUTROPHILS NFR BLD AUTO: 80.4 % (ref 42.7–76)
NRBC BLD AUTO-RTO: 0.2 /100 WBC (ref 0–0.2)
PLATELET # BLD AUTO: 147 10*3/MM3 (ref 140–450)
PMV BLD AUTO: 10.5 FL (ref 6–12)
POTASSIUM SERPL-SCNC: 3.4 MMOL/L (ref 3.5–5.2)
RBC # BLD AUTO: 3.37 10*6/MM3 (ref 3.77–5.28)
SARS-COV-2 RNA PNL SPEC NAA+PROBE: NOT DETECTED
SODIUM SERPL-SCNC: 141 MMOL/L (ref 136–145)
WBC NRBC COR # BLD: 10.96 10*3/MM3 (ref 3.4–10.8)

## 2022-09-08 PROCEDURE — G0378 HOSPITAL OBSERVATION PER HR: HCPCS

## 2022-09-08 PROCEDURE — 99497 ADVNCD CARE PLAN 30 MIN: CPT | Performed by: INTERNAL MEDICINE

## 2022-09-08 PROCEDURE — 87635 SARS-COV-2 COVID-19 AMP PRB: CPT | Performed by: NURSE PRACTITIONER

## 2022-09-08 PROCEDURE — 85025 COMPLETE CBC W/AUTO DIFF WBC: CPT | Performed by: INTERNAL MEDICINE

## 2022-09-08 PROCEDURE — 99225 PR SBSQ OBSERVATION CARE/DAY 25 MINUTES: CPT | Performed by: NURSE PRACTITIONER

## 2022-09-08 PROCEDURE — 25010000002 VANCOMYCIN PER 500 MG: Performed by: INTERNAL MEDICINE

## 2022-09-08 PROCEDURE — 80048 BASIC METABOLIC PNL TOTAL CA: CPT | Performed by: INTERNAL MEDICINE

## 2022-09-08 PROCEDURE — 99498 ADVNCD CARE PLAN ADDL 30 MIN: CPT | Performed by: INTERNAL MEDICINE

## 2022-09-08 PROCEDURE — 25010000002 PIPERACILLIN SOD-TAZOBACTAM PER 1 G: Performed by: INTERNAL MEDICINE

## 2022-09-08 PROCEDURE — 97602 WOUND(S) CARE NON-SELECTIVE: CPT

## 2022-09-08 RX ORDER — HYDROCODONE BITARTRATE AND ACETAMINOPHEN 5; 325 MG/1; MG/1
1 TABLET ORAL EVERY 6 HOURS PRN
Status: DISCONTINUED | OUTPATIENT
Start: 2022-09-08 | End: 2022-09-09 | Stop reason: HOSPADM

## 2022-09-08 RX ORDER — AMMONIUM LACTATE 12 G/100G
1 CREAM TOPICAL 2 TIMES DAILY PRN
Status: DISCONTINUED | OUTPATIENT
Start: 2022-09-08 | End: 2022-09-09 | Stop reason: HOSPADM

## 2022-09-08 RX ORDER — SODIUM HYPOCHLORITE 1.25 MG/ML
SOLUTION TOPICAL EVERY 12 HOURS SCHEDULED
Status: DISCONTINUED | OUTPATIENT
Start: 2022-09-08 | End: 2022-09-09 | Stop reason: HOSPADM

## 2022-09-08 RX ADMIN — Medication 10 ML: at 20:57

## 2022-09-08 RX ADMIN — VANCOMYCIN HYDROCHLORIDE 750 MG: 750 INJECTION, SOLUTION INTRAVENOUS at 09:03

## 2022-09-08 RX ADMIN — TAZOBACTAM SODIUM AND PIPERACILLIN SODIUM 3.38 G: 375; 3 INJECTION, SOLUTION INTRAVENOUS at 23:50

## 2022-09-08 RX ADMIN — Medication 10 ML: at 09:02

## 2022-09-08 RX ADMIN — SENNOSIDES AND DOCUSATE SODIUM 2 TABLET: 50; 8.6 TABLET ORAL at 20:56

## 2022-09-08 RX ADMIN — Medication 10 ML: at 00:04

## 2022-09-08 RX ADMIN — HYDROCODONE BITARTRATE AND ACETAMINOPHEN 1 TABLET: 5; 325 TABLET ORAL at 20:56

## 2022-09-08 RX ADMIN — CARVEDILOL 12.5 MG: 12.5 TABLET, FILM COATED ORAL at 09:02

## 2022-09-08 RX ADMIN — CARVEDILOL 12.5 MG: 12.5 TABLET, FILM COATED ORAL at 17:07

## 2022-09-08 RX ADMIN — VALSARTAN 320 MG: 80 TABLET, FILM COATED ORAL at 09:02

## 2022-09-08 RX ADMIN — TAZOBACTAM SODIUM AND PIPERACILLIN SODIUM 3.38 G: 375; 3 INJECTION, SOLUTION INTRAVENOUS at 17:08

## 2022-09-08 RX ADMIN — APIXABAN 2.5 MG: 2.5 TABLET, FILM COATED ORAL at 09:02

## 2022-09-08 RX ADMIN — ATORVASTATIN CALCIUM 40 MG: 40 TABLET, FILM COATED ORAL at 09:02

## 2022-09-08 RX ADMIN — SENNOSIDES AND DOCUSATE SODIUM 2 TABLET: 50; 8.6 TABLET ORAL at 09:01

## 2022-09-08 RX ADMIN — TAZOBACTAM SODIUM AND PIPERACILLIN SODIUM 3.38 G: 375; 3 INJECTION, SOLUTION INTRAVENOUS at 09:03

## 2022-09-08 RX ADMIN — APIXABAN 2.5 MG: 2.5 TABLET, FILM COATED ORAL at 20:56

## 2022-09-08 RX ADMIN — TAZOBACTAM SODIUM AND PIPERACILLIN SODIUM 3.38 G: 375; 3 INJECTION, SOLUTION INTRAVENOUS at 00:05

## 2022-09-08 RX ADMIN — DAKIN'S SOLUTION 0.125% (QUARTER STRENGTH) 10 ML: 0.12 SOLUTION at 21:30

## 2022-09-08 RX ADMIN — Medication 5 MG: at 20:56

## 2022-09-08 NOTE — H&P
[facility]   HISTORY AND PHYSICAL      Name:  Yovana Mcneal   Age:  89 y.o.  Sex:  female  :  3/30/1932  MRN:  [id number]   Visit Number:  [id number]  Admission Date:  2022  Date Of Service:  22  Primary Care Physician:  Domenica Fernandez DO    Chief Complaint:     Worsening sacral wound    History Of Presenting Illness:      Patient is a pleasant 89-year-old female with history significant for hypertension, hyperlipidemia, A. fib on Eliquis who presents from home due to worsening weakness and progression of sacral wounds.  Patient lives at home with her daughter who is her primary caretaker.  States that she has been doing well until approximately 1 to 2 weeks ago.  Patient would ambulate very short distances with a shuffling gait.  Her daughter states she just then completely stopped doing that and had decreased interest in food.  Despite their best interest to attend to sacral wounds, they began to worsen and daughter became concerned about foul smell and purulent drainage so she brought her to the emergency room.  No complaints of fever/chills, nausea/vomiting, diarrhea.  Upon arrival to the emergency room,  Labs significant include creatinine 1.25 (at baseline), BUN 29, albumin 2.7.  CRP 13, sed rate normal.  Lactate normal.  Procalcitonin 0.43.  WBC 11.4, hemoglobin 11.3, hematocrit 33, platelets 162.  Blood cultures obtained.  CT abdomen pelvis showed stage IV sacral decubitus ulcer with surrounding cellulitis.  No definitive evidence of osteomyelitis.  Patient received Zosyn and vancomycin in the ER and hospital service contacted for admission.    Review Of Systems:    All systems were reviewed and negative except as mentioned in history of presenting illness, assessment and plan.    Past Medical History: Patient  has a past medical history of Hyperlipidemia, Hypertension, and Myocardial infarction (HCC).    Past Surgical History: Patient  has a past surgical  history that includes Hysterectomy.    Social History: Patient  reports that she has never smoked. She does not have any smokeless tobacco history on file. She reports that she does not drink alcohol and does not use drugs.    Family History:  Patient's family history has been reviewed and found to be non-contributory.     Allergies:      Patient has no known allergies.    Home Medications:    Prior to Admission Medications     Prescriptions Last Dose Informant Patient Reported? Taking?    apixaban (ELIQUIS) 5 MG tablet tablet   Yes No    Take 5 mg by mouth 2 (Two) Times a Day.    atorvastatin (LIPITOR) 40 MG tablet  Medication Bottle Yes No    Take 40 mg by mouth Daily.    carvedilol (COREG) 12.5 MG tablet  Medication Bottle Yes No    Take 12.5 mg by mouth 2 (Two) Times a Day With Meals.    cephalexin (KEFLEX) 500 MG capsule   No No    Take 1 capsule by mouth 3 (Three) Times a Day.    Cholecalciferol (VITAMIN D3) 5000 units capsule capsule  Medication Bottle Yes No    Take 5,000 Units by mouth 1 (One) Time Per Week.    clindamycin (CLEOCIN) 300 MG capsule   Yes No    Take 300 mg by mouth 3 (Three) Times a Day.    furosemide (LASIX) 20 MG tablet   Yes No    Take 20 mg by mouth 2 (Two) Times a Day.    loratadine (CLARITIN) 10 MG tablet   Yes No    Take 10 mg by mouth Daily.    mupirocin (BACTROBAN) 2 % ointment   No No    Apply  topically 3 (Three) Times a Day.    potassium chloride (K-DUR) 10 MEQ CR tablet  Medication Bottle Yes No    Take 10 mEq by mouth 2 (Two) Times a Day.    triamterene-hydrochlorothiazide (MAXZIDE) 75-50 MG per tablet   Yes No    Take 1 tablet by mouth Daily.    valsartan (DIOVAN) 320 MG tablet  Medication Bottle Yes No    Take 320 mg by mouth Daily.    vitamin D (ERGOCALCIFEROL) 1.25 MG (65962 UT) capsule capsule   Yes No    Take 50,000 Units by mouth 1 (One) Time Per Week.        ED Medications:    Medications   carvedilol (COREG) tablet 12.5 mg (has no administration in time range)    atorvastatin (LIPITOR) tablet 40 mg (has no administration in time range)   apixaban (ELIQUIS) tablet 2.5 mg (has no administration in time range)   sodium chloride 0.9 % flush 10 mL (has no administration in time range)   sodium chloride 0.9 % flush 10 mL (has no administration in time range)   acetaminophen (TYLENOL) tablet 650 mg (has no administration in time range)     Or   acetaminophen (TYLENOL) 160 MG/5ML solution 650 mg (has no administration in time range)     Or   acetaminophen (TYLENOL) suppository 650 mg (has no administration in time range)   ondansetron (ZOFRAN) injection 4 mg (has no administration in time range)   sennosides-docusate (PERICOLACE) 8.6-50 MG per tablet 2 tablet (has no administration in time range)     And   polyethylene glycol (MIRALAX) packet 17 g (has no administration in time range)     And   bisacodyl (DULCOLAX) EC tablet 5 mg (has no administration in time range)     And   bisacodyl (DULCOLAX) suppository 10 mg (has no administration in time range)   melatonin tablet 5 mg (has no administration in time range)   Morphine sulfate (PF) injection 1 mg (has no administration in time range)     And   naloxone (NARCAN) injection 0.4 mg (has no administration in time range)   Pharmacy to dose vancomycin (has no administration in time range)   Pharmacy to Dose Zosyn (has no administration in time range)   vancomycin 1750 mg/500 mL 0.9% NS IVPB ([facility]) (0 mg Intravenous Stopped 9/7/22 1941)   piperacillin-tazobactam (ZOSYN) 3.375 g in iso-osmotic dextrose 50 ml (premix) (0 g Intravenous Stopped 9/7/22 1739)   iopamidol (ISOVUE-300) 61 % injection 100 mL (100 mL Intravenous Given 9/7/22 1721)     Vital Signs:  Temp:  [97.7 °F (36.5 °C)-98.5 °F (36.9 °C)] 97.7 °F (36.5 °C)  Heart Rate:  [51-71] 51  Resp:  [18] 18  BP: (145-188)/(68-75) 188/68        09/07/22 2003   Weight: 71.3 kg (157 lb 3 oz)     Body mass index is 25.37 kg/m².    Physical Exam:     Most recent vital Signs: BP (!) 188/68  "(BP Location: Right arm, Patient Position: Lying)   Pulse 51   Temp 97.7 °F (36.5 °C) (Oral)   Resp 18   Ht 167.6 cm (66\")   Wt 71.3 kg (157 lb 3 oz)   SpO2 100%   BMI 25.37 kg/m²     Physical Exam  Constitutional:       Appearance: She is ill-appearing.   HENT:      Head: Normocephalic and atraumatic.      Right Ear: External ear normal.      Left Ear: External ear normal.      Mouth/Throat:      Mouth: Mucous membranes are dry.      Pharynx: Oropharynx is clear.   Eyes:      Conjunctiva/sclera: Conjunctivae normal.      Pupils: Pupils are equal, round, and reactive to light.   Cardiovascular:      Rate and Rhythm: Normal rate and regular rhythm.      Pulses: Normal pulses.      Heart sounds: Normal heart sounds.   Pulmonary:      Effort: Pulmonary effort is normal.      Breath sounds: Normal breath sounds.   Abdominal:      General: Bowel sounds are normal. There is no distension.      Palpations: Abdomen is soft.   Musculoskeletal:      Right lower leg: Edema present.      Left lower leg: Edema present.      Comments: Chronic bilateral lower extremity lymphedema   Skin:     General: Skin is dry.      Comments: Venous stasis dermatitis   Neurological:      Mental Status: She is alert. Mental status is at baseline.   Psychiatric:         Mood and Affect: Mood normal.         Laboratory data:    I have reviewed the labs done in the emergency room.    Results from last 7 days   Lab Units 09/07/22  1620   SODIUM mmol/L 143   POTASSIUM mmol/L 3.7   CHLORIDE mmol/L 103   CO2 mmol/L 27.8   BUN mg/dL 29*   CREATININE mg/dL 1.25*   CALCIUM mg/dL 9.2   BILIRUBIN mg/dL 0.9   ALK PHOS U/L 58   ALT (SGPT) U/L 9   AST (SGOT) U/L 19   GLUCOSE mg/dL 96     Results from last 7 days   Lab Units 09/07/22  1620   WBC 10*3/mm3 11.44*   HEMOGLOBIN g/dL 11.3*   HEMATOCRIT % 33.1*   PLATELETS 10*3/mm3 162                                   Invalid input(s): USDES,  BLOODU, NITRITITE, BACT, EP    Pain Management Panel     Pain " Management Panel Latest Ref Rng & Units 12/6/2015    CREATININE UR 15.0 - 278.0 mg/dL 28.9          EKG:          Radiology:    CT Pelvis With Contrast    Result Date: 9/7/2022  FINAL REPORT TECHNIQUE: Axial images were obtained from the iliac crest to the pubic symphysis by computed tomography.  This study was performed with techniques to keep radiation doses as low as reasonably achievable,( ALARA).  Individualized dose reduction techniques using automated exposure control or adjustment of mA and/or KV according to the patient's size were employed. CLINICAL HISTORY: infected decubitus ulcer, please scan down through proximal thighs FINDINGS: PELVIS:  Bones:  No acute displaced pelvic or hip fractures. Soft tissues:  There is a stage IV right ischial decubitus ulcer with adjacent cellulitis.  No evidence of abscess but there is diffuse body wall edema.  Incidentally imaged is a lipoma in the left hamstring muscle.  The appendix is not visualized.  The urinary bladder is unremarkable.  There is no significant fluid or adenopathy.  The visualized portions of the GI tract are nonobstructed.     Stage IV right issue decubitus ulcer with adjacent cellulitis. No convincing CT evidence of osteomyelitis. Authenticated and Electronically Signed by Angelo Ruiz MD on 09/07/2022 06:26:05 PM      Assessment:    1. 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 Pulmonary hypertension  3. CKD  4. Hypertension  5. Hyperlipidemia  6. Atrial fibrillation on Eliquis  7. CHF  8. Chronic lymphedema  9. Impaired mobility and ADLs    Plan:    Will admit patient to the hospital.    Stage IV sacral decubitus ulcer with adjacent cellulitis  - Empiric antibiotics: Vancomycin and Zosyn  - CT does not show definitive evidence of osteomyelitis  - Wound care consult  -Morphine for pain control as needed    Hypertension  - Patient's renal function is at baseline so we will continue patient's home valsartan as well as Coreg  -  Does appear to be dry on exam so we will hold Maxide, may consider restarting in the a.m.  - IV hydralazine as needed for blood pressure control    Atrial fibrillation  - Decrease Eliquis to 2.5 mg twice daily  -Currently remains rate controlled, continue home medications      I have confirmed with patient's daughter and son at bedside that she is a DNR/DNI.  I have also placed for palliative care nurse consult as I feel as though patient would benefit from their services whether at home or at long-term care facility.    Risk Assessment: High  DVT Prophylaxis: Eliquis  Code Status: DNR/DNI  Diet: Cardiac    Advance Care Planning   ACP discussion was declined by the patient. Patient does not have an advance directive, information provided.           Reji Bassett DO  09/07/22  21:03 EDT    Dictated utilizing Dragon dictation.

## 2022-09-08 NOTE — PLAN OF CARE
Pt presented to the hospital via EMS with c/o worsening weakness and progressing sacral wound.    Admit dx:  Stage IV sacral decubitis ulcer with adjacent cellulitis; Hypertension; A. Fib.    PMH:  HTN; HLD; A. Fib; CKD    Code status:  DNR DNI    Pt does not have advanced directives.      Visited pt's room; her son (Duncan) was at bedside.  The pt was awake sitting up in bed. She appeared to be comfortable.  Her son provided most of the information.  The pt lives with her daughter (Jennifer) and son (Duncan).   The pt has three children.  A second son (Tigre) lives close by.The family has been the primary caregivers.      As soon as I introduced myself, Duncan voiced concerns for her wound and requested to have Dr. Bonds see the pt.  Dr. Bonds contacted and agrees to meet with family around 1:30 today.     Per son, the pt had been doing okay but began declining 2 weeks prior to admission.  Duncan expressed understanding that her wound was very bad and doesn't believe they can do anything else for it.  He stated that they are not able to care for the patient at home.      Brief conversation had re criteria for hospice and care at the [facility].  He did not state any desired discharge plans at this time.  He stated that he would contact his sister and brother and wants to discuss plans in meeting with Dr. Bonds.    Pt was observed when her lunch was delivered to her room.  When asked if she wanted to eat, she shooked her head and said she wasn't hungry.      1405    Dr. Bonds and I visited pt's room; her three children were at bedside.  The pt was awake resting in bed. She appeared to be confused about who Dr. Bonds was when she introduced herself and again when Duncan told her that her son and daughter were at the bedside.      Meeting with pt's children (Tigre, Duncan & Dash) was held in the counseling room on 3rd floor outside the ICU.  Family expressed their concerns for pt's rapid  decline over the pt 2 weeks.  Jennifer reported that pt had been seen by wound care doctor in [place] for wounds on her legs but at that time there was no sign of any on her bottom.  Both Jennifer and Duncan stated the wound came up on the pt approximately 3 to 4 days prior to admission.  They verbalized understanding that it was a deep wound and probably would not heal and that pt was at risk for developing more due to her decline status and poor nutritional intake.  Family all agreed that they wanted the patient to be comfortable and that they did not believe she would want any aggressive measures.  They inquired about their options for hospice.  They all agreed that they did not want her to have to go to LTC.      Dr. Bonds presented a very thorough discussion of hospice care at home vs care at the [facility].  Hospice care at home included a nurse at least 1x/week but 24/7on call if needed, a nursing tech 2 to 3 times a week, and the possibility of volunteers (but was told not guaranteed).  Strict  guidelines for symptoms management discussed re care at the center.  Family was informed that pt could meet criteria for 3 or 4 days to develop a wound care regimen but longer if distressed symptoms occurred.  Family was informed of private pay care assistance and recommendations for contacting family, friends, and [employer] was made.  Family verbalized understanding and stated their agreement for transfer to [facility] when medically stable.  OLLIE Barrera Mona, RN and LUZ Castro updated.     Called [facility]; spoke to Latrice.  Referral for discharge to [facility] given.  She will pull needed info from Epic.  A covid test will be needed prior to discharge.  OLLIE Barrera and JOSE Muniz updated.    Plan:  Discharge to [facility] on Friday.

## 2022-09-08 NOTE — PLAN OF CARE
Goal Outcome Evaluation:  Plan of Care Reviewed With: patient, family      Interventions implemented as appropriate.

## 2022-09-08 NOTE — CASE MANAGEMENT/SOCIAL WORK
"Discharge Planning Assessment  [facility]     Patient Name: Yovana Mcneal  MRN: [id number]  Today's Date: 9/8/2022    Admit Date: 9/7/2022     Discharge Needs Assessment     Row Name 09/08/22 1531       Living Environment    People in Home child(judi), adult    Name(s) of People in Home Duncan mcneal, son; Jennifer Mcneal, dtr    Current Living Arrangements home    Duration at Residence years    Potentially Unsafe Housing Conditions unable to assess    Primary Care Provided by child(judi)    Provides Primary Care For no one, unable/limited ability to care for self    Caregiving Concerns Duncan states patient's needs exceed the family's capabilities and they cant do it anymore    Family Caregiver if Needed none    Quality of Family Relationships helpful    Able to Return to Prior Arrangements --  TBD    Living Arrangement Comments son states he does not want her going into a \"nursing home\" as he does not believe she can be properly cared for there. He verbalizes concerns about severity of sacral wound.       Resource/Environmental Concerns    Resource/Environmental Concerns none    Transportation Concerns other (see comments)  previously used [facility] due to immobility       Transition Planning    Patient/Family Anticipates Transition to other (see comments)  TBD; family meeting with Dr Moran today    Patient/Family Anticipated Services at Transition durable medical equipment;other (see comments)  other c TBD    Transportation Anticipated other (see comments)  [facility] or EMS. Pt nonambulatory per son       Discharge Needs Assessment    Equipment Currently Used at Home shower chair;walker, rolling;hospital bed  pt has old hospital bed that belinged to sister; son would like a new one if possible    Concerns to be Addressed cognitive/perceptual;discharge planning    Concerns Comments Disposition pending mtg between Dr Moran and family    Anticipated Changes Related to Illness none    Equipment Needed After " "Discharge hospital bed;lift device  depends on discharge disposition; may need lift, bed if she goes home    Discharge Facility/Level of Care Needs other (see comments)  pending    Provided Post Acute Provider List? N/A    Provided Post Acute Provider Quality & Resource List? N/A    Current Discharge Risk dependent with mobility/activities of daily living               Discharge Plan     Row Name 22 1543       Plan    Plan Cm met with pt and son, Duncan Mcneal/[phone] to discuss DCP. Pt confirms her name and . Duncan confirms her address . He states her PCP is ARSLAN Fernandez and she uses [facility]. He states his sister Jennifer/[phone] is pt's POA.  Jennifer has lived with pt for several years and taken care of her. Duncan moved in the home in May and assists with pt care. He states they do everything for her and the level of care she needs exceeds their ability. He states she has a wound on her backside that you could probably stick 3 fingers in.Pt  used [employer] transportation several months ago to wound care clinic in [facility] she received tx for leg wounds. He states pt ihas been unable to walk for quite a while.   . He states he and his sister use to clean her by standing her up,  but she has been unable to stand for the last week. Pt  feeds herself \"sometimes\" but has recently had a poor appetite. He stated more than once the family would not put her in a \"nursing home\" as he does not believe they can provide adequate care. He states he and his family are meeting with Dr Moran this afternoon to further discuss how to proceed with pt care. He would like to get her a new hospital bed. She is currenly using her sister's which is \"old.\" CM contact info left at bedside. CM to follow              Continued Care and Services - Admitted Since 2022    Coordination has not been started for this encounter.       Expected Discharge Date and Time     Expected Discharge Date Expected " Discharge Time    Sep 10, 2022          Demographic Summary     Row Name 09/08/22 1447       General Information    Admission Type observation    Arrived From emergency department    Referral Source admission list    Reason for Consult discharge planning    Preferred Language English       Contact Information    Permission Granted to Share Info With family/designee    Contact Information Comments Duncan Mcneal/son/[phone]; Jennifer Mcneal/dtr/ [phone]               Functional Status     Row Name 09/08/22 1520       Functional Status    Usual Activity Tolerance poor    Current Activity Tolerance poor    Functional Status Comments total care       Assessment of Health Literacy    Health Literacy Low       Functional Status, IADL    Medications completely dependent    Meal Preparation completely dependent    Housekeeping completely dependent    Laundry completely dependent    Shopping completely dependent    IADL Comments son states she is total care       Employment/    Current or Previous Occupation not applicable               Psychosocial    No documentation.                Abuse/Neglect    No documentation.                Legal    No documentation.                Substance Abuse    No documentation.                Patient Forms    No documentation.                   Marcela Aiken, APRN

## 2022-09-08 NOTE — CONSULTS
"Adult Nutrition  Assessment/PES    Patient Name:  Yovana Mcneal  YOB: 1932  MRN: [id number]  Admit Date:  9/7/2022    Assessment Date:  9/8/2022    Comments:      Recommend:    1. Continue current diet as medically appropriate and tolerated.   2. Continue to encourage PO intake as appropriate. Pt PO intake of 50% x 1 meal.   3. RD ordered Boost Breeze BID and Arginaid TID.   4. Consider renal MVI with minerals daily to promote wound healing.   5. Continue to monitor and replace electrolytes PRN.    RD to follow pt and available PRN.      Reason for Assessment     Row Name 09/08/22 1245          Reason for Assessment    Reason For Assessment per organizational policy;nurse/nurse practitioner consult;identified at risk by screening criteria     Diagnosis cardiac disease;renal disease;other (see comments)  HTN, HLD, CKD, CHF     Identified At Risk by Screening Criteria large or nonhealing wound, burn or pressure injury                  Labs/Tests/Procedures/Meds     Row Name 09/08/22 1246          Labs/Procedures/Meds    Lab Results Reviewed reviewed, pertinent     Lab Results Comments high: glu, Cr, BUN, CRP, procal low: K+, alb            Medications    Pertinent Medications Reviewed reviewed, pertinent     Pertinent Medications Comments lipitor, pericolace, NaCl                Physical Findings     Row Name 09/08/22 1247          Physical Findings    Overall Physical Appearance overweight, missing teeth, 3+ edema bilateral ankles, PI right gluteal, sacral spine wound, left gluteal wound, right gluteal wound                Estimated/Assessed Needs - Anthropometrics     Row Name 09/08/22 1253 09/08/22 0541       Anthropometrics    Weight -- 71.3 kg (157 lb 3 oz)    Age for Calculations 89 --    Height for Calculation 1.676 m (5' 6\") --    Weight for Calculation 66.7 kg (147 lb)  est dry bw --       Estimated/Assessed Needs    Additional Documentation Fluid Requirements (Group);Estimated Calorie Needs " (Group);[place]-St. Jeor Equation (Group);KCAL/KG (Group);Protein Requirements (Group) --       Estimated Calorie Needs    Estimated Calorie Requirement (kcal/day) 2000  30 kcal/kg --    Estimated Calorie Need Method kcal/kg --       KCAL/KG    KCAL/KG 25 Kcal/Kg (kcal);30 Kcal/Kg (kcal) --    25 Kcal/Kg (kcal) 1666.975 --    30 Kcal/Kg (kcal) 2000.37 --       [place]-St. Jeor Equation    RMR ([place]-St. Jeor Equation) 1103.54 --    [place]-St. Jeor Activity Factors 1.4 - 1.5 --    Activity Factors ([place]-St. Jeor) 1544.956 - 1655.31 --       Protein Requirements    Weight Used For Protein Calculations 66.7 kg (147 lb)  est dry bw --    Est Protein Requirement Amount (gms/kg) 1.2 gm protein  80-87 --    Estimated Protein Requirements (gms/day) 80.01 --       Fluid Requirements    Fluid Requirements (mL/day) 2000 --    Estimated Fluid Requirement Method other (see comments)  1 mL/kcal --    RDA Method (mL) 2000 --               Nutrition Prescription Ordered     Row Name 09/08/22 1259          Nutrition Prescription PO    Current PO Diet Regular     Common Modifiers Cardiac                Evaluation of Received Nutrient/Fluid Intake     Row Name 09/08/22 1300          PO Evaluation    Number of Days PO Intake Evaluated Insufficient Data     Number of Meals 1     % PO Intake 50                   Problem/Interventions:   Problem 1     Row Name 09/08/22 1300          Nutrition Diagnoses Problem 1    Problem 1 Increased Nutrient Needs     Macronutrient Kcal;Fluid;Protein     Micronutrient Vitamin;Mineral     Etiology (related to) Medical Diagnosis     Skin Cellulitis;Skin breakdown     Signs/Symptoms (evidenced by) Other (comment);Biochemical  stage 4 PI with cellulitis     Labs Reviewed Done     Specific Labs Noted C Reactive Protein                      Intervention Goal     Row Name 09/08/22 1301          Intervention Goal    General Maintain nutrition;Disease management/therapy;Reduce/improve symptoms;Improved  nutrition related lab(s);Meet nutritional needs for age/condition     PO Meet estimated needs;Establish PO;Tolerate PO;Increase intake     Weight Maintain weight                Nutrition Intervention     Row Name 09/08/22 1301          Nutrition Intervention    RD/Tech Action Follow Tx progress;Care plan reviewd;Encourage intake;Recommend/ordered     Recommended/Ordered Supplement                Nutrition Prescription     Row Name 09/08/22 1301          Nutrition Prescription PO    PO Prescription Begin/change supplement;Other (comment)  Continue current diet as medically appropriate and tolerated     Supplement Boost Breeze  Arginaid     Supplement Frequency 3 times a day;2 times a day     Common Modifiers Cardiac     New PO Prescription Ordered? No, recommended            Other Orders    Obtain Weight Daily     Obtain Weight Ordered? No, recommended     Supplement Vitamin mineral supplement  renal MVI     Supplement Ordered? No, recommended     Other Continue to monitor and replace electrolytes PRN                Education/Evaluation     Row Name 09/08/22 1302          Education    Education Education not appropriate at this time     Please explain Patient confusion            Monitor/Evaluation    Monitor Per protocol;I&O;PO intake;Pertinent labs;Supplement intake;Weight;Skin status;Symptoms                 Electronically signed by:  Roxanne Felder RD  09/08/22 13:02 EDT

## 2022-09-08 NOTE — PROGRESS NOTES
[facility]    Name:  Yovana Mcneal   Age:  89 y.o.  Sex:  female  :  3/30/1932  MRN:  [id number]   Visit Number:  [id number]  Admission Date:  2022  Date Of Service:  22  Primary Care Physician:  Domenica Fernandez DO     LOS: 0 days :    Chief Complaint:      Sacral Wound    Subjective:    Patient is seen and evaluated at bedside this morning with son present on exam.  Son states that patient lives with him and his sister and they have been trying to take care of their mom.  However, she has had a pressure ulcer for a couple weeks per son's report that worsened when it became infected.  He advises patient is in a wheelchair at baseline and they can no longer give her the care she requires.  Requesting placement on admission.  Son reports that patient knows who they are most of the time but does have some baseline confusion.  Wound care has been consulted for recommendations to significant pressure ulcer and multiple areas of skin breakdown to buttocks and sacral area.    Hospital Course:    Patient is a pleasant 89-year-old female with history significant for hypertension, hyperlipidemia, A. fib on Eliquis who presents from home due to worsening weakness and progression of sacral wounds.  Patient lives at home with her daughter who is her primary caretaker.  States that she has been doing well until approximately 1 to 2 weeks ago.  Patient would ambulate very short distances with a shuffling gait.  Her daughter states she just then completely stopped doing that and had decreased interest in food.  Despite their best interest to attend to sacral wounds, they began to worsen and daughter became concerned about foul smell and purulent drainage so she brought her to the emergency room.  No complaints of fever/chills, nausea/vomiting, diarrhea.    Upon arrival to the emergency room, labs significant include creatinine 1.25 (at baseline), BUN 29, albumin 2.7.   CRP 13, sed rate normal.  Lactate normal.  Procalcitonin 0.43.  WBC 11.4, hemoglobin 11.3, hematocrit 33, platelets 162.  Blood cultures obtained.  CT abdomen pelvis showed stage IV sacral decubitus ulcer with surrounding cellulitis.  No definitive evidence of osteomyelitis.    Patient admitted and started on Vancomycin and Zosyn.  Wound care consulted.  Family is requesting some sort of placement.    Review of Systems:     All systems were reviewed and negative except as mentioned in subjective, assessment and plan.    Vital Signs:    Temp:  [97.2 °F (36.2 °C)-98.5 °F (36.9 °C)] 97.5 °F (36.4 °C)  Heart Rate:  [50-71] 62  Resp:  [16-18] 16  BP: (110-188)/(52-75) 110/52    Intake and output:    I/O last 3 completed shifts:  In: 1217 [P.O.:600; I.V.:67; IV Piggyback:550]  Out: -   I/O this shift:  In: 120 [P.O.:120]  Out: -     Physical Examination:    General Appearance:  Alert and cooperative, elderly female, appears frail and chronically ill, no acute distress on exam   Head:  Atraumatic and normocephalic.   Eyes: Conjunctivae and sclerae normal, no icterus. No pallor.   Throat: No oral lesions, no thrush, oral mucosa dry.   Neck: Supple, trachea midline   Lungs:   Breath sounds heard bilaterally equally.  No wheezing or crackles. Unlabored on room air   Heart:  Normal S1 and S2, no murmur, no gallop, no rub. No JVD.   Abdomen:   Normal bowel sounds, no masses, no organomegaly. Soft, nontender, nondistended, no rebound tenderness.   Extremities: Supple, lower extremity edema present bilaterally, no cyanosis, no clubbing.   Skin: No bleeding or rash, chronic venous stasis changes to bilateral legs, scaly appearing.  Multiple areas of skin breakdown to bilateral buttocks.  Large stage IV decubitus ulcer to left buttock/ upper leg    Neurologic: Alert and oriented x 3. No facial asymmetry. Moves all four limbs. Generalized weakness is present.     Laboratory results:    Results from last 7 days   Lab Units  09/08/22  0618 09/07/22  1620   SODIUM mmol/L 141 143   POTASSIUM mmol/L 3.4* 3.7   CHLORIDE mmol/L 103 103   CO2 mmol/L 24.5 27.8   BUN mg/dL 25* 29*   CREATININE mg/dL 1.11* 1.25*   CALCIUM mg/dL 8.8 9.2   BILIRUBIN mg/dL  --  0.9   ALK PHOS U/L  --  58   ALT (SGPT) U/L  --  9   AST (SGOT) U/L  --  19   GLUCOSE mg/dL 101* 96     Results from last 7 days   Lab Units 09/08/22 0618 09/07/22  1620   WBC 10*3/mm3 10.96* 11.44*   HEMOGLOBIN g/dL 10.6* 11.3*   HEMATOCRIT % 30.7* 33.1*   PLATELETS 10*3/mm3 147 162                 No results for input(s): PHART, BQX4UZM, PO2ART, YWN7ACD, BASEEXCESS in the last 8760 hours.   I have reviewed the patient's laboratory results.    Radiology results:    CT Pelvis With Contrast    Result Date: 9/7/2022  FINAL REPORT TECHNIQUE: Axial images were obtained from the iliac crest to the pubic symphysis by computed tomography.  This study was performed with techniques to keep radiation doses as low as reasonably achievable,( ALARA).  Individualized dose reduction techniques using automated exposure control or adjustment of mA and/or KV according to the patient's size were employed. CLINICAL HISTORY: infected decubitus ulcer, please scan down through proximal thighs FINDINGS: PELVIS:  Bones:  No acute displaced pelvic or hip fractures. Soft tissues:  There is a stage IV right ischial decubitus ulcer with adjacent cellulitis.  No evidence of abscess but there is diffuse body wall edema.  Incidentally imaged is a lipoma in the left hamstring muscle.  The appendix is not visualized.  The urinary bladder is unremarkable.  There is no significant fluid or adenopathy.  The visualized portions of the GI tract are nonobstructed.     Impression: Stage IV right issue decubitus ulcer with adjacent cellulitis. No convincing CT evidence of osteomyelitis. Authenticated and Electronically Signed by Angelo Ruiz MD on 09/07/2022 06:26:05 PM    I have reviewed the patient's radiology  reports.    Medication Review:     I have reviewed the patient's active and prn medications.     Problem List:      Pressure injury, stage 4, with infection (HCC)      Assessment:    1. 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 Pulmonary hypertension  3. CKD  4. Hypertension  5. Hyperlipidemia  6. Atrial fibrillation on Eliquis  7. CHF  8. Chronic lymphedema  9. Impaired mobility and ADLs      Plan:       Stage IV sacral decubitus ulcer with adjacent cellulitis  - Empiric antibiotic coverage with Vancomycin and Zosyn  - CT does not show definitive evidence of osteomyelitis  - Wound care consult as there is significant breakdown to coccyx and buttocks  -Morphine for pain control as needed     Hypertension  - Patient's renal function is at baseline-Continue home valsartan and Coreg  - Does appear to be dry on exam so we will hold Maxide, may consider restarting tomorrow  -Continue with IV hydralazine as needed for blood pressure control     Atrial fibrillation  - Decreased Eliquis to 2.5 mg twice daily  -Currently rate controlled, continue home medications as reconciled    DVT Prophylaxis: Eliquis  Code Status: DNR/DNI  Diet: Cardiac  Discharge Plan: Family requesting placement    Holly Sy, APRN  09/08/22  12:12 EDT    Dictated utilizing Dragon dictation.

## 2022-09-08 NOTE — CONSULTS
[facility]    GOALS OF CARE DISCUSSION CONSULT      Name:  Yovana Mcneal   Age:  89 y.o.  Sex:  female  :  3/30/1932  MRN:  [id number]   Visit Number:  [id number]  Date of Service:  -  Date of Admission:  2022  Primary Care Physician:  Domenica Fernandez DO      Consulting Physician:    Dr. Reji Bassett    Reason For Consult:    Addressing goals of care in the setting of complex clinical course and progressive decline    Hospital Diagnosis:    • Stage IV ischial pressure injury wound with cellulitis  • Pulmonary hypertension  • Atrial fibrillation, off anticoagulation  • Essential hypertension  • Chronic kidney disease  • Congestive heart failure  • Chronic lymphedema involving bilateral lower extremities  • Severe debility and functional decline    Brief Summary:    I reviewed in detail patient's clinical course on admission and sequence of events since then as reflected by history and physical, progress notes, consults, staff input and work-up.  In summary, Mrs. Mcneal is a 89 years old female with history of pulmonary hypertension, atrial fibrillation essential hypertension who was hospitalized after presenting with increasing weakness and worsening ischial wound.  Work-up was unremarkable including CT of the abdomen and pelvis negative for evidence of osteomyelitis.  Since admission, IV Zosyn and vancomycin were administered.  Despite optimal medical management, patient continued to be frail with increased confusion and discomfort due to history of wound.  Palliative care consultation was requested given above presentation.  Advance Care Planning   1.  Determination of decisional capacity:    ·  During my visit, patient was unable to engage in a conversation or appreciate options presented, neither was she capable of her expressing her wishes.  Therefore, she lacked decisional capacity.    2.  Advanced Directives:    · None on record  · 3 children conclud to patient's  medical decisions decisions in consensus  · DO NOT RESUSCITATE, DO NOT INTUBATE    3.  Summary of family Meeting:    · Meeting attendees included 2 sons, 1 daughter, palliative care RN and myself  · Meeting location: ICU counseling room    Summary of the discussion:    · After initial introductions and presenting the role of palliative care team, family were encouraged to describe patient's clinical course during the past few months leading to current hospitalization.  They stated that she was in her normal state of health until 2 weeks ago when she was noted to have decreased appetite, decreased p.o. intake and increased signs of discomfort.  Meanwhile, her daughter were concerned due to foul odor and purulent drainage noted on her garments.  They are aware of her clinical condition, sequence of events since admission, work-up and concerns due to her severe frailty and poor functional status indicating poor prognosis.  · We had a lengthy and detailed discussion regarding their expectation of patient's wishes given her compromised quality of life and poor prognosis.  Discussion concluded to collective decision to defer further aggressive diagnostic or therapeutic interventions.  Additionally, they confirmed DO NOT RESUSCITATE, DO NOT INTUBATE CODE STATUS.  · I proceeded to present options for further plans moving forward.  Daughter described increased burden of care due to patient's progressive decline.  In alignment with their decision to defer aggressive measures, I suggested hospice services to ensure patient's comfort and support for the family.  I presented an outline regarding Medicare hospice benefit, levels of care and services provided.  I explained details regarding criteria for inpatient hospice services.  In Mrs. Mcneal's case, complex wound management for stage IV ischial wound would qualify for acute inpatient symptom management and requirement for skilled nursing care.  However such admission would be  limited for few days with the intent of optimizing wound care plan and teaching caregiver on providing such care at home.  · After lengthy discussion in this regard, family collectively agreed with proceeding with plans for transfer to the [facility] once felt appropriate by primary attending  · Above was communicated to case management and primary attending hospitalist who concurred and will follow on further plans.    Conclusions:    · Code status: DO NOT RESUSCITATE, DO NOT INTUBATE  · Medical interventions: Shift medical management to comfort care upon discharge, transfer to the [facility] for inpatient symptom management  · Advanced directives: EMS DNR to be completed prior to discharge       I appreciate the opportunity to participate in Mrs. Mcneal's care.  Please feel free to contact me for any upcoming questions or concerns as need arises.     Total time spent in counseling and advanced care planning discussion per above documentation 80 min.     Dragon system was utilized for this dictation; therefore unintended spelling or expressions may be noted in the body of this document.

## 2022-09-08 NOTE — CASE MANAGEMENT/SOCIAL WORK
Cm received call from LT/Palliative care RN stating pt/family met with Dr Moran and tentative plan if for her to DC to [facility] tomorrow.

## 2022-09-08 NOTE — PHARMACY RECOMMENDATION
"Pharmacy Consult - Vancomycin Dosing    Pharmacy was consulted to dose vancomycin for  Yovana Mcneal, a 89 y.o. female  167.6 cm (66\") 71.3 kg (157 lb 3 oz)    Indication: SSTI  Consulting Provider: Dr. Bassett    Goal AUC: 400-600 mg/:L*hr    Labs  Results from last 7 days   Lab Units 09/08/22  0618 09/07/22  1620   WBC 10*3/mm3 10.96* 11.44*   CREATININE mg/dL 1.11* 1.25*      Estimated Creatinine Clearance: 34.1 mL/min (A) (by C-G formula based on SCr of 1.11 mg/dL (H)).  Temp Readings from Last 1 Encounters:   09/08/22 97.2 °F (36.2 °C) (Axillary)       Other Antimicrobials    Zosyn 3.375 g IV every 8 hours     InsightRX AUC Calculation    Predicted Steady State AUC on Current Dose: 437 mg/L*hr    Assessment/Plan    Continue current vancomycin dose: 750 mg IV every 24 hours   Assess clearance by vancomycin level on 9/9 @ 0600.  Pharmacy will continue to monitor renal function, cultures and sensitivities, and clinical status to adjust regimen as necessary.      Thank you,  Stefany Duke, PharmD, BCPS  09/08/22 09:19 EDT    "

## 2022-09-08 NOTE — PLAN OF CARE
Goal Outcome Evaluation:              Outcome Evaluation: Pt admitted with stage 4 wound on right gluteal area and multiple stage 2 areas on buttocks.  Family supportive and at bedside.  Pt receiving IV antibiotics per orders.  Turned and repositioned q 2 hrs.  VSS.  Continue per plan of care.

## 2022-09-08 NOTE — NURSING NOTE
Seen for wound consult. Dr Pichardo at bedside for wound assessment and education. Family at bedside with anticipated transfer to [facility]. Bety RN assisted with holding patient. Pressure injuries consistent with someone who spends a lot of time sitting. Right ischial wound with purulent drainage, tunnels, undermining, slough, malodorous, muscle evident. Agree that dakins dressings twice daily and prn after cleansing if incontinence disrupts dressing. Right gluteal, left gluteal and sacral stage 2 pressure injuries apply barrier and cover with dressing to be changed if incontinence disrupts dressing. Dr Pichardo talked about a brody catheter being placed to divert urinary incontinence. Currently has a pure wick in place.   Bilateral lower legs with edema, hyperkeratotic skin. Right lateral leg with reddened elongated boggy area. Recommend pressure relief.   Recommendations for care include a turning schedule, barrier cream twice daily and prn after cleansing, using dry sheets in folds, float heels off bed with pillows or heel lift boots, encourage increase mobility as appropriate and tolerated to reduce pressure, for dry skin apply lotion/cream and a nutrition consult for dietary needs. Staff to contact provider and re-consult wound nurse for new skin issues or lack of improvement with current recommendations. Thank you for the consult. If you have questions or concerns do not hesitate to contact me.

## 2022-09-09 VITALS
HEART RATE: 85 BPM | OXYGEN SATURATION: 96 % | BODY MASS INDEX: 25.69 KG/M2 | SYSTOLIC BLOOD PRESSURE: 90 MMHG | TEMPERATURE: 97.5 F | RESPIRATION RATE: 18 BRPM | HEIGHT: 66 IN | WEIGHT: 159.83 LBS | DIASTOLIC BLOOD PRESSURE: 43 MMHG

## 2022-09-09 LAB
ANION GAP SERPL CALCULATED.3IONS-SCNC: 10.6 MMOL/L (ref 5–15)
BUN SERPL-MCNC: 32 MG/DL (ref 8–23)
BUN/CREAT SERPL: 26.9 (ref 7–25)
CALCIUM SPEC-SCNC: 8.4 MG/DL (ref 8.2–9.6)
CHLORIDE SERPL-SCNC: 103 MMOL/L (ref 98–107)
CO2 SERPL-SCNC: 24.4 MMOL/L (ref 22–29)
CREAT SERPL-MCNC: 1.19 MG/DL (ref 0.57–1)
CRP SERPL-MCNC: 11.93 MG/DL (ref 0–0.5)
DEPRECATED RDW RBC AUTO: 56.5 FL (ref 37–54)
EGFRCR SERPLBLD CKD-EPI 2021: 43.5 ML/MIN/1.73
ERYTHROCYTE [DISTWIDTH] IN BLOOD BY AUTOMATED COUNT: 17.2 % (ref 12.3–15.4)
GLUCOSE SERPL-MCNC: 122 MG/DL (ref 65–99)
HCT VFR BLD AUTO: 26.8 % (ref 34–46.6)
HGB BLD-MCNC: 9.4 G/DL (ref 12–15.9)
MCH RBC QN AUTO: 31.9 PG (ref 26.6–33)
MCHC RBC AUTO-ENTMCNC: 35.1 G/DL (ref 31.5–35.7)
MCV RBC AUTO: 90.8 FL (ref 79–97)
PLATELET # BLD AUTO: 145 10*3/MM3 (ref 140–450)
PMV BLD AUTO: 10.7 FL (ref 6–12)
POTASSIUM SERPL-SCNC: 3.4 MMOL/L (ref 3.5–5.2)
RBC # BLD AUTO: 2.95 10*6/MM3 (ref 3.77–5.28)
SODIUM SERPL-SCNC: 138 MMOL/L (ref 136–145)
VANCOMYCIN SERPL-MCNC: 14.9 MCG/ML (ref 5–40)
WBC NRBC COR # BLD: 9.71 10*3/MM3 (ref 3.4–10.8)

## 2022-09-09 PROCEDURE — 99217 PR OBSERVATION CARE DISCHARGE MANAGEMENT: CPT | Performed by: NURSE PRACTITIONER

## 2022-09-09 PROCEDURE — 97602 WOUND(S) CARE NON-SELECTIVE: CPT

## 2022-09-09 PROCEDURE — G0378 HOSPITAL OBSERVATION PER HR: HCPCS

## 2022-09-09 PROCEDURE — 86140 C-REACTIVE PROTEIN: CPT | Performed by: NURSE PRACTITIONER

## 2022-09-09 PROCEDURE — 80048 BASIC METABOLIC PNL TOTAL CA: CPT | Performed by: NURSE PRACTITIONER

## 2022-09-09 PROCEDURE — 25010000002 PIPERACILLIN SOD-TAZOBACTAM PER 1 G: Performed by: INTERNAL MEDICINE

## 2022-09-09 PROCEDURE — 80202 ASSAY OF VANCOMYCIN: CPT | Performed by: INTERNAL MEDICINE

## 2022-09-09 PROCEDURE — 25010000002 VANCOMYCIN PER 500 MG: Performed by: INTERNAL MEDICINE

## 2022-09-09 PROCEDURE — 85027 COMPLETE CBC AUTOMATED: CPT | Performed by: NURSE PRACTITIONER

## 2022-09-09 RX ORDER — CARBOXYMETHYLCELLULOSE SODIUM 5 MG/ML
1 SOLUTION/ DROPS OPHTHALMIC 3 TIMES DAILY PRN
Status: DISCONTINUED | OUTPATIENT
Start: 2022-09-09 | End: 2022-09-09 | Stop reason: HOSPADM

## 2022-09-09 RX ORDER — SODIUM HYPOCHLORITE 1.25 MG/ML
1 SOLUTION TOPICAL EVERY 12 HOURS SCHEDULED
Start: 2022-09-09

## 2022-09-09 RX ORDER — AMMONIUM LACTATE 12 G/100G
1 CREAM TOPICAL 2 TIMES DAILY PRN
Start: 2022-09-09

## 2022-09-09 RX ORDER — DOXYCYCLINE HYCLATE 100 MG/1
100 CAPSULE ORAL 2 TIMES DAILY
Qty: 16 CAPSULE | Refills: 0
Start: 2022-09-09 | End: 2022-09-17

## 2022-09-09 RX ADMIN — DAKIN'S SOLUTION 0.125% (QUARTER STRENGTH): 0.12 SOLUTION at 11:33

## 2022-09-09 RX ADMIN — Medication 10 ML: at 09:16

## 2022-09-09 RX ADMIN — ATORVASTATIN CALCIUM 40 MG: 40 TABLET, FILM COATED ORAL at 09:16

## 2022-09-09 RX ADMIN — VANCOMYCIN HYDROCHLORIDE 750 MG: 750 INJECTION, SOLUTION INTRAVENOUS at 09:26

## 2022-09-09 RX ADMIN — TAZOBACTAM SODIUM AND PIPERACILLIN SODIUM 3.38 G: 375; 3 INJECTION, SOLUTION INTRAVENOUS at 09:15

## 2022-09-09 RX ADMIN — HYDROCODONE BITARTRATE AND ACETAMINOPHEN 1 TABLET: 5; 325 TABLET ORAL at 09:16

## 2022-09-09 RX ADMIN — CARVEDILOL 12.5 MG: 12.5 TABLET, FILM COATED ORAL at 09:15

## 2022-09-09 RX ADMIN — CARBOXYMETHYLCELLULOSE SODIUM 1 DROP: 5 SOLUTION/ DROPS OPHTHALMIC at 11:33

## 2022-09-09 RX ADMIN — SENNOSIDES AND DOCUSATE SODIUM 2 TABLET: 50; 8.6 TABLET ORAL at 09:15

## 2022-09-09 RX ADMIN — VALSARTAN 320 MG: 80 TABLET, FILM COATED ORAL at 09:15

## 2022-09-09 RX ADMIN — APIXABAN 2.5 MG: 2.5 TABLET, FILM COATED ORAL at 09:16

## 2022-09-09 NOTE — PLAN OF CARE
Goal Outcome Evaluation:         Patient in a stable condition for discharge to [facility]  today, wound care cleaned well with Dakin's as directed, all discharge instructions given to daughter Jennifer who presented at bedside.I called [facility] and gave report to nurse Arnav.waiting for the EMS for transportation.

## 2022-09-09 NOTE — DISCHARGE SUMMARY
[facility]   DISCHARGE SUMMARY      Name:  Yovana Mcneal   Age:  89 y.o.  Sex:  female  :  3/30/1932  MRN:  [id number]   Visit Number:  [id number]    Admission Date:  2022  Date of Discharge:  2022  Primary Care Physician:  Domenica Fernandez DO    Important issues to note:    1.  Admitted to the hospital for Stage IV pressure ulcer with multiple areas of breakdown to buttocks and sacral area.  Started on Vancomycin and Zosyn with no convincing signs of osteomyelitis.    2.  Palliative care team consulted.  Discussed with patient's children who did not want to pursue any aggressive measures for treatment and would like to pursue hospice.      3.  Patient stable for discharge to the [facility] today.  Will transition to oral Doxycycline after discussing with Dr. Bonds.    Discharge Diagnoses:     1. 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 Pulmonary hypertension  3. CKD  4. Hypertension  5. Hyperlipidemia  6. Atrial fibrillation on Eliquis  7. CHF  8. Chronic lymphedema  9. Impaired mobility and ADLs      Problem List:     Active Hospital Problems    Diagnosis  POA   • **Pressure injury, stage 4, with infection (HCC) [L89.94, L08.9]  Yes      Resolved Hospital Problems   No resolved problems to display.     Presenting Problem:    Chief Complaint   Patient presents with   • Wound Check      Consults:     Consulting Physician(s)  Chat With All Active Members    Provider Relationship Specialty    Tal Bonds MD  Consulting Physician Internal Medicine        History of presenting illness/Hospital Course:    Patient is a 89 year old female with past health history significant for hypertension, hyperlipidemia, A. fib on Eliquis who presents from home with family due to worsening weakness and progression of sacral wounds.  Patient lives with her daughter who is her primary caretaker.  Stated that she has been doing well until  approximately 1 to 2 weeks ago.  Patient would ambulate very short distances with a shuffling gait.  Her daughter states she just then completely stopped doing that and had decreased interest in food.  Despite their best efforts trying to treat sacral wounds, they began to worsen and daughter became concerned about foul smell and purulent drainage so she brought her to the emergency room.       Upon arrival to the emergency room, labs significant include creatinine 1.25 (at baseline), BUN 29, albumin 2.7.  CRP 13, sed rate normal.  Lactate normal.  Procalcitonin 0.43.  WBC 11.4, hemoglobin 11.3, hematocrit 33, platelets 162.  Blood cultures obtained.  CT abdomen pelvis showed stage IV sacral decubitus ulcer with surrounding cellulitis.  No definitive evidence of osteomyelitis.     Patient admitted and started on Vancomycin and Zosyn.  Wound care consulted.  Family is requesting some sort of placement.  Palliative Care team was also consulted.  After meeting with the children they decided they did not want any aggressive measures regarding treatment and would like to pursue hospice at discharge.  Patient will be discharged to the [facility] today for further care.       Vital Signs:    Temp:  [97.2 °F (36.2 °C)-97.6 °F (36.4 °C)] 97.4 °F (36.3 °C)  Heart Rate:  [60-82] 82  Resp:  [16-17] 16  BP: ()/(42-52) 91/42    Physical Exam:    General Appearance:  Alert and cooperative, elderly female, no acute distress on exam, appears frail and chronically ill   Head:  Atraumatic and normocephalic.   Eyes: Conjunctivae and sclerae normal, no icterus. No pallor.   Ears:  Ears with no abnormalities noted.   Throat: No oral lesions, no thrush, oral mucosa moist.   Neck: Supple, trachea midline   Back:   No kyphoscoliosis present. No tenderness to palpation.   Lungs:   Breath sounds heard bilaterally equally.  No crackles or wheezing. Unlabored on room air.   Heart:  Normal S1 and S2, no murmur, no gallop, no  rub. No JVD.   Abdomen:   Normal bowel sounds, no masses, no organomegaly. Soft, nontender, nondistended, no rebound tenderness.   Extremities: Supple, no edema, no cyanosis, no clubbing.   Pulses: Pulses palpable bilaterally.   Skin: No bleeding or rash, chronic venous stasis changes to bilateral legs, scaly appearing.  Multiple areas of skin breakdown to bilateral buttocks.  Large stage IV decubitus ulcer to left buttock/ upper leg    Neurologic: Alert and oriented x 3. No facial asymmetry. Moves all four limbs.  Generalized weakness is present.     Pertinent Lab Results:     Results from last 7 days   Lab Units 09/09/22  0559 09/08/22  0618 09/07/22  1620   SODIUM mmol/L 138 141 143   POTASSIUM mmol/L 3.4* 3.4* 3.7   CHLORIDE mmol/L 103 103 103   CO2 mmol/L 24.4 24.5 27.8   BUN mg/dL 32* 25* 29*   CREATININE mg/dL 1.19* 1.11* 1.25*   CALCIUM mg/dL 8.4 8.8 9.2   BILIRUBIN mg/dL  --   --  0.9   ALK PHOS U/L  --   --  58   ALT (SGPT) U/L  --   --  9   AST (SGOT) U/L  --   --  19   GLUCOSE mg/dL 122* 101* 96     Results from last 7 days   Lab Units 09/09/22  0559 09/08/22  0618 09/07/22  1620   WBC 10*3/mm3 9.71 10.96* 11.44*   HEMOGLOBIN g/dL 9.4* 10.6* 11.3*   HEMATOCRIT % 26.8* 30.7* 33.1*   PLATELETS 10*3/mm3 145 147 162                             Results from last 7 days   Lab Units 09/07/22  1644 09/07/22  1633   BLOODCX  No growth at 24 hours No growth at 24 hours       Pertinent Radiology Results:    Imaging Results (All)     Procedure Component Value Units Date/Time    CT Pelvis With Contrast [[id number]] Collected: 09/07/22 1826     Updated: 09/07/22 1827    Narrative:      FINAL REPORT    TECHNIQUE:  Axial images were obtained from the iliac crest to the pubic  symphysis by computed tomography.  This study was performed with  techniques to keep radiation doses as low as reasonably  achievable,( ALARA).  Individualized dose reduction techniques  using automated exposure control or adjustment of mA and/or  KV  according to the patient's size were employed.    CLINICAL HISTORY:  infected decubitus ulcer, please scan down through proximal  thighs    FINDINGS:  PELVIS:  Bones:  No acute displaced pelvic or hip fractures.  Soft tissues:  There is a stage IV right ischial decubitus ulcer  with adjacent cellulitis.  No evidence of abscess but there is  diffuse body wall edema.  Incidentally imaged is a lipoma in the  left hamstring muscle.  The appendix is not visualized.  The  urinary bladder is unremarkable.  There is no significant fluid  or adenopathy.  The visualized portions of the GI tract are  nonobstructed.      Impression:      Stage IV right issue decubitus ulcer with adjacent cellulitis.  No convincing CT evidence of osteomyelitis.    Authenticated and Electronically Signed by Angelo Ruiz MD on  09/07/2022 06:26:05 PM        Condition on Discharge:      Stable.    Code status during the hospital stay:    Code Status and Medical Interventions:   Ordered at: 09/07/22 2103     Medical Intervention Limits:    NO intubation (DNI)     Code Status (Patient has no pulse and is not breathing):    No CPR (Do Not Attempt to Resuscitate)     Medical Interventions (Patient has pulse or is breathing):    Limited Support     Discharge Disposition:    Hospice/Medical Facility (DC - External)    Discharge Medications:       Discharge Medications      New Medications      Instructions Start Date   ammonium lactate 12 % cream  Commonly known as: AMLACTIN   1 application, Topical, 2 Times Daily PRN      doxycycline 100 MG capsule  Commonly known as: VIBRAMYCIN   100 mg, Oral, 2 Times Daily      sodium hypochlorite 0.125 % solution topical solution 0.125%  Commonly known as: DAKIN'S 1/4 STRENGTH   1 mL, Topical, Every 12 Hours Scheduled         Changes to Medications      Instructions Start Date   apixaban 2.5 MG tablet tablet  Commonly known as: OSCAR  What changed:   · medication strength  · how much to take  · when to take  this   2.5 mg, Oral, Every 12 Hours Scheduled         Continue These Medications      Instructions Start Date   acetaminophen 325 MG tablet  Commonly known as: TYLENOL   650 mg, Oral, Every 8 Hours      atorvastatin 40 MG tablet  Commonly known as: LIPITOR   40 mg, Oral, Daily      carvedilol 12.5 MG tablet  Commonly known as: COREG   12.5 mg, Oral, 2 Times Daily With Meals      furosemide 20 MG tablet  Commonly known as: LASIX   20 mg, Oral, 2 Times Daily      loratadine 10 MG tablet  Commonly known as: CLARITIN   10 mg, Oral, Daily      mupirocin 2 % ointment  Commonly known as: BACTROBAN   Topical, 3 Times Daily      potassium chloride 10 MEQ CR tablet   10 mEq, Oral, 2 Times Daily      triamterene-hydrochlorothiazide 75-50 MG per tablet  Commonly known as: MAXZIDE   1 tablet, Oral, Daily      valsartan 320 MG tablet  Commonly known as: DIOVAN   320 mg, Oral, Daily      vitamin D 1.25 MG (90661 UT) capsule capsule  Commonly known as: ERGOCALCIFEROL   50,000 Units, Oral, Weekly      vitamin D3 125 MCG (5000 UT) capsule capsule   5,000 Units, Oral, Weekly         Stop These Medications    cephalexin 500 MG capsule  Commonly known as: KEFLEX     clindamycin 300 MG capsule  Commonly known as: CLEOCIN          Discharge Diet:     Diet Instructions     Diet: Regular, Cardiac      Discharge Diet:  Regular  Cardiac           Activity at Discharge:     Activity Instructions     Activity as Tolerated          Follow-up Appointments:     Follow-up Information     Domenica Fernandez DO .    Specialty: Family Medicine  Contact information:  [address] [place] [address] [phone]                       No future appointments.  Test Results Pending at Discharge:    Pending Labs     Order Current Status    Blood Culture - Blood, Arm, Left Preliminary result    Blood Culture - Blood, Hand, Left Preliminary result             Holly Sy, OLLIE  09/09/22  09:00 EDT    Time: I spent 25 minutes on this discharge  activity which included: face-to-face encounter with the patient, reviewing the data in the system, coordination of the care with the nursing staff as well as consultants, documentation, and entering orders.     Dictated utilizing Dragon dictation.

## 2022-09-09 NOTE — PLAN OF CARE
[facility] to verify [facility] information received.   left for return call to verify okay to call report.  JOSE Lezama updated.    Spoke to Latrice with HCP via phone call.  She verified all needed information has been received and loaded into their system.  Okay to call report.  Report number is [phone].  JOSE Lezama updated.    Per conversation with family yesterday, pt will be transported via EMS.  EMS DNR has been completed and placed in pt's chart.                                  needle stick

## 2022-09-09 NOTE — PLAN OF CARE
Goal Outcome Evaluation:              Outcome Evaluation: Pt resting well this shift.  Pain meds given as needed.  Wound care per orders. Daughter at bedside this shift.  VSS.  Plans are for pt to go to [facility] [place] tomorrow.

## 2022-09-12 LAB
BACTERIA SPEC AEROBE CULT: NORMAL
BACTERIA SPEC AEROBE CULT: NORMAL